# Patient Record
Sex: FEMALE | Race: BLACK OR AFRICAN AMERICAN
[De-identification: names, ages, dates, MRNs, and addresses within clinical notes are randomized per-mention and may not be internally consistent; named-entity substitution may affect disease eponyms.]

---

## 2018-05-13 ENCOUNTER — HOSPITAL ENCOUNTER (EMERGENCY)
Dept: HOSPITAL 74 - JER | Age: 18
Discharge: HOME | End: 2018-05-13
Payer: COMMERCIAL

## 2018-05-13 VITALS — HEART RATE: 71 BPM | SYSTOLIC BLOOD PRESSURE: 125 MMHG | TEMPERATURE: 97.9 F | DIASTOLIC BLOOD PRESSURE: 69 MMHG

## 2018-05-13 VITALS — BODY MASS INDEX: 34.4 KG/M2

## 2018-05-13 DIAGNOSIS — J45.21: Primary | ICD-10-CM

## 2018-05-13 PROCEDURE — 3E0F7GC INTRODUCTION OF OTHER THERAPEUTIC SUBSTANCE INTO RESPIRATORY TRACT, VIA NATURAL OR ARTIFICIAL OPENING: ICD-10-PCS | Performed by: EMERGENCY MEDICINE

## 2018-05-13 PROCEDURE — 3E033GC INTRODUCTION OF OTHER THERAPEUTIC SUBSTANCE INTO PERIPHERAL VEIN, PERCUTANEOUS APPROACH: ICD-10-PCS | Performed by: EMERGENCY MEDICINE

## 2018-05-13 PROCEDURE — 3E0333Z INTRODUCTION OF ANTI-INFLAMMATORY INTO PERIPHERAL VEIN, PERCUTANEOUS APPROACH: ICD-10-PCS | Performed by: EMERGENCY MEDICINE

## 2018-05-13 NOTE — PDOC
History of Present Illness





- General


Stated Complaint: ASTHMA


History Source: Patient


Exam Limitations: No Limitations





- History of Present Illness


Initial Comments: 


This is a 17 YOF with h/o asthma (never admitted, intubated, or placed on 

steroids) who presents from [facility] for SOB, wheezing, cough, and chest/

upper back tightness. She notes that this is typical of her normal asthma 

exacerbations in every way, and the only reason she came into the ED today is 

that she lost her normal albuterol MDI and has no refills left at the pharmacy. 

She denies any recent fever, chills, nausea, vomiting, diarrhea, constipation, 

rashes, headache, neck pain, palpitations, or other symptoms. She uses the 

Nexplanon for birth control, has not had recent leg swelling/pain, no recent 

travel or long periods of immobility, no recent surgery, etc.





Past History





- Past Medical History


Allergies/Adverse Reactions: 


 Allergies











Allergy/AdvReac Type Severity Reaction Status Date / Time


 


No Known Allergies Allergy   Verified 05/12/16 11:02











Home Medications: 


Ambulatory Orders





Etonogestrel [Nexplanon] 68 mg SQ ASDIR 05/19/16 


Albuterol Sulfate [Proventil HFA Inhaler -] 1 - 2 inh PO QID #1 inhaler 05/13/ 18 


Prednisone [Deltasone] 40 mg PO DAILY #4 tablet 05/13/18 








Asthma: No (patient DENIES asthma)





- Surgical History


Appendectomy: Yes





- Immunization History


Immunization Up to Date: Yes





- Suicide/Smoking/Psychosocial Hx


Smoking History: Never smoked


Have you smoked in the past 12 months: Yes


Number of Cigarettes Smoked Daily: 6


'Breaking Loose' booklet given: 05/12/16


Hx Alcohol Use: No


Drug/Substance Use Hx: No


Substance Use Type: None





**Review of Systems





- Review of Systems


Able to Perform ROS?: Yes


Constitutional: No: Chills, Fever, Unexplained wgt Loss


HEENTM: No: Nose Congestion, Throat Pain


Respiratory: Yes: Cough, Shortness of Breath, Wheezing


Cardiac (ROS): No: Chest Pain, Palpitations


ABD/GI: No: Constipated, Diarrhea, Nausea, Vomiting


: No: Burning, Dysuria


Musculoskeletal: Yes: Back Pain (upper).  No: Neck Pain


Integumentary: No: Bruising, Rash


Neurological: No: Headache, Numbness, Tingling, Weakness, Dizziness


Endocrine: No: Unexplained Weight Gain, Unexplained Weight Loss





*Physical Exam





- Physical Exam


General Appearance: Yes: Nourished, Appropriately Dressed, Other (nontoxic 

appearing young adult female in minimal distress after DuoNeb)


HEENT: positive: EOMI, Normal Voice, Hearing Grossly Normal.  negative: Scleral 

Icterus (R), Scleral Icterus (L), Nasal Congestion


Neck: positive: Trachea midline, Supple.  negative: Tender, Rigid


Respiratory/Chest: positive: Respiratory Distress (minimal), Rhonchi, Wheezing (

marked bilateral expiratory).  negative: Accessory Muscle Use, Crackles, Stridor


Cardiovascular: positive: Regular Rhythm, Regular Rate, S1, S2.  negative: Edema

, JVD, Murmur


Gastrointestinal/Abdominal: positive: Normal Bowel Sounds, Flat, Soft.  negative

: Tender, Organomegaly, Pulsatile Mass, Guarding


Musculoskeletal: positive: Normal Inspection.  negative: Decreased Range of 

Motion, Vertebral Tenderness


Extremity: positive: Normal Capillary Refill, Normal Inspection, Normal Range 

of Motion.  negative: Tender, Cyanosis


Integumentary: positive: Normal Color, Dry, Warm.  negative: Erythema, Rash, 

Bruising


Neurologic: positive: CNs II-XII NML intact (grossly), Fully Oriented, Alert, 

Normal Mood/Affect, Normal Response, Motor Strength 5/5





**Heart Score/ECG Review


  ** #1





05/13/18 05:11


NSR, rate 87, normal axis and intervals, no ST-T changes





Medical Decision Making





- Medical Decision Making


Patient with h/o asthma p/w respiratory distress like their prior asthma 

exacerbation.


No reported h/o asthma resulting in intubation, PTX, seizure, LOC, hypercapnia, 

acidosis, etc.





 Initial Vital Signs











Temp Pulse Resp BP Pulse Ox


 


 97.8 F   88   20   124/80   100 


 


 05/13/18 05:24  05/13/18 05:24  05/13/18 05:24  05/13/18 05:24  05/13/18 05:24











Exam: Mild labored respirations, marked bilateral expiratory wheezes and rhonchi

, receiving DuoNebs during exam.


DDX IBNLT: asthma exacerbation, COPD, bronchitis, viral URI, PNA, PTX, PE, etc.


W/U ordered: EKG, CXR


TX ordered: DuoNebs x3, SoluMedrol, Magnesium 1 mg.





EKG: NSR, rate 87, normal axis and intervals, no ST-T changes


CXR: NADP


Reassessment: Patient feels much better, comfortable going home


Repeat VS:





The patient has gotten significant relief of symptoms with ED medications.


Workup is not concerning for emergency-level pathology at this time.


Patient states they need new E-Rx for Albuterol MDI.


E-Rx sent to patient's pharmacy for Albuterol MDI.


4 day course prednisone 40 mg sent to pharmacy.


The patient is appropriate for discharge with close outpatient follow up.


The patient is comfortable with this plan and will follow up with their PCP in 1

-3 days.


Return precautions are discussed and they will come back to the ER if necessary.





*DC/Admit/Observation/Transfer


Diagnosis at time of Disposition: 


Asthma exacerbation


Qualifiers:


 Asthma severity: unspecified severity Asthma persistence: intermittent 

Qualified Code(s): J45.21 - Mild intermittent asthma with (acute) exacerbation








- Discharge Dispostion


Disposition: HOME


Condition at time of disposition: Stable


Decision to Admit order: No





- Prescriptions


Prescriptions: 


Albuterol Sulfate [Proventil HFA Inhaler -] 1 - 2 inh PO QID #1 inhaler


Prednisone [Deltasone] 40 mg PO DAILY #4 tablet





- Referrals





- Patient Instructions


Printed Discharge Instructions:  DI for Asthma -- Adult


Additional Instructions: 


You were seen in the ER for asthma exacerbation. We gave you steroids and 

breathing treatments which resolved your symptoms while you were here in the 

department. We did blood work and a chest x-ray which did not show any 

concerning findings. After our assessment, we do not believe you are having a 

medical emergency at this time, and we believe you are safe to go home. We are 

sending a prescription for prednisone to your pharmacy. Please take the whole 

course as prescribed, and follow up with your regular doctor(s) in the next 1-3 

days. Call their clinic ASAP, tell them you were seen in the ER for asthma, and 

tell them you need an appointment. If you are using your Albuterol inhaler more 

than three times a day regularly, you need to talk with your doctor about 

starting on another type of asthma inhaler that is preventative and scheduled. 

Please come back to the ER at any time (24 hours a day) for any new or 

worsening symptoms, like worsened wheezing/shortness of breath that is not 

relieved with your home medications, new severe chest pain, loss of 

consciousness, or seizure. If you are having severe or life threatening symptoms

, or symptoms that make it unsafe to drive or have someone drive you, please 

call 911.





- Post Discharge Activity

## 2018-05-13 NOTE — EKG
Test Reason : 

Blood Pressure : ***/*** mmHG

Vent. Rate : 087 BPM     Atrial Rate : 087 BPM

   P-R Int : 152 ms          QRS Dur : 078 ms

    QT Int : 372 ms       P-R-T Axes : 066 078 051 degrees

   QTc Int : 447 ms

 

NORMAL SINUS RHYTHM

NORMAL ECG

WHEN COMPARED WITH ECG OF 12-MAY-2016 10:57,

NO SIGNIFICANT CHANGE WAS FOUND

Confirmed by JESSICA MARISCAL MD ([id number]) on 5/13/2018 9:44:46 PM

 

Referred By:             Confirmed By:JESSICA MARISCAL MD

## 2019-06-11 ENCOUNTER — HOSPITAL ENCOUNTER (EMERGENCY)
Dept: HOSPITAL 74 - JER | Age: 19
LOS: 1 days | Discharge: HOME | End: 2019-06-12
Payer: COMMERCIAL

## 2019-06-11 DIAGNOSIS — J45.901: Primary | ICD-10-CM

## 2019-06-11 DIAGNOSIS — F17.210: ICD-10-CM

## 2019-06-11 DIAGNOSIS — J20.9: ICD-10-CM

## 2019-07-12 ENCOUNTER — HOSPITAL ENCOUNTER (EMERGENCY)
Dept: HOSPITAL 74 - JERFT | Age: 19
Discharge: HOME | End: 2019-07-12
Payer: SELF-PAY

## 2019-07-12 VITALS — HEART RATE: 83 BPM | TEMPERATURE: 98.5 F | DIASTOLIC BLOOD PRESSURE: 62 MMHG | SYSTOLIC BLOOD PRESSURE: 120 MMHG

## 2019-07-12 VITALS — BODY MASS INDEX: 38 KG/M2

## 2019-07-12 DIAGNOSIS — S86.012A: ICD-10-CM

## 2019-07-12 DIAGNOSIS — O99.89: Primary | ICD-10-CM
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
## 2019-07-12 DIAGNOSIS — Z3A.01: ICD-10-CM
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
## 2019-07-12 NOTE — PDOC
Rapid Medical Evaluation


Time Seen by Provider: 07/12/19 18:05


Medical Evaluation: 


 Allergies











Allergy/AdvReac Type Severity Reaction Status Date / Time


 


apple Allergy   Verified 06/11/19 23:05











07/12/19 18:07


I have performed a brief in-person evaluation of this patient.





The patient presents with a chief complaint of: sudden onset left calf pain 

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





Pertinent physical exam findings: tender to left posterior ankle





I have ordered the following: xray, ice





The patient will proceed to the ED for further evaluation.


07/12/19 18:10








**Discharge Disposition





- Diagnosis


 Left ankle pain








- Referrals





- Patient Instructions





- Post Discharge Activity

## 2019-07-12 NOTE — PDOC
History of Present Illness





- General


Chief Complaint: Pain, Acute


Stated Complaint: LEFT ANKLE PAIN


Time Seen by Provider: 07/12/19 18:05


History Source: Patient


Exam Limitations: No Limitations





Past History





- Past Medical History


Allergies/Adverse Reactions: 


 Allergies











Allergy/AdvReac Type Severity Reaction Status Date / Time


 


apple Allergy   Verified 07/12/19 18:10











Home Medications: 


Ambulatory Orders





Acetaminophen [Tylenol] 650 mg PO Q4H #45 capsule 07/12/19 


Prenat 115/Iron Fum/Folic/Dss [Prenatal 19 Tablet] 1 each PO DAILY #30 tablet 07 /12/19 








Asthma: No (patient DENIES asthma)


COPD: No





- Surgical History


Appendectomy: Yes





- Immunization History


Immunization Up to Date: Yes





- Suicide/Smoking/Psychosocial Hx


Smoking History: Never smoked


Have you smoked in the past 12 months: No


Number of Cigarettes Smoked Daily: 6


'Breaking Loose' booklet given: 05/12/16


Hx Alcohol Use: No


Drug/Substance Use Hx: No


Substance Use Type: None





**Review of Systems





- Review of Systems


Able to Perform ROS?: Yes


Comments:: 





07/12/19 19:51


CONSTITUTIONAL: 


Absent: fever, chills, diaphoresis, generalized weakness, malaise, loss of 

appetite


MUSCULOSKELETAL: 


Present: L leg pain, unable to flex L foot. Absent: arthralgia, joint swelling


SKIN: 


Absent: rash, itching, pallor


NEUROLOGIC: 


Absent: headache, focal weakness or paresthesias, dizziness, unsteady gait, 

seizure, mental status changes, bladder or bowel incontinence


PSYCHIATRIC: 


Absent: anxiety, depression, suicidal or homicidal ideation, hallucinations.





Is the patient limited English proficient: No





*Physical Exam





- Vital Signs


 Last Vital Signs











Temp Pulse Resp BP Pulse Ox


 


 98.5 F   83   16   120/62   99 


 


 07/12/19 18:05  07/12/19 18:05  07/12/19 18:05  07/12/19 18:05  07/12/19 18:05














- Physical Exam


Comments: 





07/12/19 19:52


GENERAL: The patient is awake, alert, and fully oriented, in no acute distress.


HEAD: Normal with no signs of trauma.


EYES: Pupils equal, round and reactive to light, extraocular movements intact, 

sclera anicteric, conjunctiva clear.


EXTREMITIES: L lower leg with TTP at the insertion of the achilles tendon at 

the heel. (+) Cardoza test. Distal pulses intact. Normal range of motion, no 

edema.


NEUROLOGICAL: Normal speech, normal gait.


PSYCH: Normal mood, normal affect.


SKIN: Warm, Dry, normal turgor, no rashes or lesions noted.





Procedures





- Splinting


Splint Location: Left: Ankle


Pre-Proc Neuro Vasc Exam: normal


Hand-Made Type: orthoglass


Splint Type: Yes: Long Leg (volar to stabilize a ruptured Achilles tendon.)


Post-Proc Neuro Vasc Exam: unchanged from pre-exam


Ace Bandage: 4"





ED Treatment Course





- ADDITIONAL ORDERS


Additional order review: 


 Laboratory  Results











  07/12/19





  18:13


 


Urine HCG, Qual  Positive














Medical Decision Making





- Medical Decision Making





07/12/19 20:11


The patient is a 19-year-old female who presents to the ER today with left 

ankle pain.  The patient states she was moving something at work when she felt 

like someone kicked her in the back of the ankle.  She states she is unable to 

put any weight on that foot at this time.  Denies birth control use, Cipro or 

Levaquin use.  Denies numbness and tingling to the affected extremity.





A/P: Achilles tendon tear


On exam patient with a positive Cardoza test on the left side.  Unable to flex 

with any strength.


X-ray of the tib-fib shows no acute fractures


Clinically suspect a left Achilles tendon tear.


Patient was placed in a volar splint in extension of the left leg


Of note patient is also pregnancy test positive.  OB/GYN follow-up was given.


Patient referred to the [facility] outpatient clinic for 

further care of her Achilles tendon tear she does not have insurance at this 

time.


Crutches given patient may nonweightbearing


Discharge home


I discussed the physical exam findings, ancillary test results and final 

diagnoses with the patient. I answered all of the patient's questions. The 

patient was satisfied with the care received and felt comfortable with the 

discharge plan and treatment plan.  The Patient agrees to follow up with the 

primary care physician/specialist within 24-72 hours. Return precautions were 

given.





*DC/Admit/Observation/Transfer


Diagnosis at time of Disposition: 


Achilles tendon rupture


Qualifiers:


 Encounter type: initial encounter Laterality: left Qualified Code(s): S86.012A 

- Strain of left Achilles tendon, initial encounter





Pregnancy


Qualifiers:


 Weeks of gestation: less than 8 weeks Qualified Code(s): Z3A.01 - Less than 8 

weeks gestation of pregnancy








- Discharge Dispostion


Disposition: HOME


Condition at time of disposition: Stable


Decision to Admit order: No





- Prescriptions


Prescriptions: 


Acetaminophen [Tylenol] 650 mg PO Q4H #45 capsule


Prenat 115/Iron Fum/Folic/Dss [Prenatal 19 Tablet] 1 each PO DAILY #30 tablet





- Referrals


Referrals: 


Odin Montano MD [Staff Physician] - 





- Patient Instructions


Printed Discharge Instructions:  DI for Achilles Tendon Rupture


Additional Instructions: 


You ruptured her Achilles tendon.


Please leave the splint on until you are seen by orthopedics.


Do not get the splint wet


You need to follow-up with orthopedics on Monday for further evaluation.  


Please follow-up with [facility] in the orthopedic clinic.


You are also pregnant


Please follow up with OB/GYN. A referral has been provided to you


Start taking daily prenatal vitamins


You may take Tylenol 650mg every 4 hours as needed for pain 





Return to the ER for worsening pain, fever, or if you have any changes in your 

symptoms





For orthopedics


[facility]


[facility] ([facility] 


[address] [place] [id number]    (Directions) [phone]





- Post Discharge Activity

## 2019-07-24 ENCOUNTER — HOSPITAL ENCOUNTER (EMERGENCY)
Dept: HOSPITAL 74 - JER | Age: 19
Discharge: HOME | End: 2019-07-24
Payer: COMMERCIAL

## 2019-07-24 VITALS — BODY MASS INDEX: 38.3 KG/M2

## 2019-07-24 VITALS — HEART RATE: 89 BPM | DIASTOLIC BLOOD PRESSURE: 71 MMHG | SYSTOLIC BLOOD PRESSURE: 116 MMHG | TEMPERATURE: 97.9 F

## 2019-07-24 DIAGNOSIS — O26.891: Primary | ICD-10-CM

## 2019-07-24 DIAGNOSIS — Z3A.01: ICD-10-CM

## 2019-07-24 DIAGNOSIS — R10.9: ICD-10-CM

## 2019-07-24 LAB
ALBUMIN SERPL-MCNC: 3.8 G/DL (ref 3.4–5)
ALP SERPL-CCNC: 53 U/L (ref 45–117)
ALT SERPL-CCNC: 12 U/L (ref 13–61)
ANION GAP SERPL CALC-SCNC: 7 MMOL/L (ref 8–16)
APPEARANCE UR: CLEAR
AST SERPL-CCNC: 7 U/L (ref 15–37)
BASOPHILS # BLD: 0.2 % (ref 0–2)
BILIRUB SERPL-MCNC: 0.3 MG/DL (ref 0.2–1)
BILIRUB UR STRIP.AUTO-MCNC: NEGATIVE MG/DL
BUN SERPL-MCNC: 10.8 MG/DL (ref 7–18)
CALCIUM SERPL-MCNC: 8.8 MG/DL (ref 8.5–10.1)
CHLORIDE SERPL-SCNC: 107 MMOL/L (ref 98–107)
CO2 SERPL-SCNC: 24 MMOL/L (ref 21–32)
COLOR UR: YELLOW
CREAT SERPL-MCNC: 0.7 MG/DL (ref 0.55–1.3)
DEPRECATED RDW RBC AUTO: 13.1 % (ref 11.6–15.6)
EOSINOPHIL # BLD: 1.2 % (ref 0–4.5)
GLUCOSE SERPL-MCNC: 97 MG/DL (ref 74–106)
HCT VFR BLD CALC: 40.1 % (ref 32.4–45.2)
HGB BLD-MCNC: 14 GM/DL (ref 10.7–15.3)
KETONES UR QL STRIP: (no result)
LEUKOCYTE ESTERASE UR QL STRIP.AUTO: NEGATIVE
LYMPHOCYTES # BLD: 23.7 % (ref 8–40)
MCH RBC QN AUTO: 31.7 PG (ref 25.7–33.7)
MCHC RBC AUTO-ENTMCNC: 34.8 G/DL (ref 32–36)
MCV RBC: 91 FL (ref 80–96)
MONOCYTES # BLD AUTO: 5.7 % (ref 3.8–10.2)
NEUTROPHILS # BLD: 69.2 % (ref 42.8–82.8)
NITRITE UR QL STRIP: NEGATIVE
PH UR: 6 [PH] (ref 5–8)
PLATELET # BLD AUTO: 257 K/MM3 (ref 134–434)
PMV BLD: 8.6 FL (ref 7.5–11.1)
POTASSIUM SERPLBLD-SCNC: 4 MMOL/L (ref 3.5–5.1)
PROT SERPL-MCNC: 6.8 G/DL (ref 6.4–8.2)
PROT UR QL STRIP: NEGATIVE
PROT UR QL STRIP: NEGATIVE
RBC # BLD AUTO: 4.4 M/MM3 (ref 3.6–5.2)
SODIUM SERPL-SCNC: 139 MMOL/L (ref 136–145)
SP GR UR: 1.03 (ref 1.01–1.03)
UROBILINOGEN UR STRIP-MCNC: 0.2 MG/DL (ref 0.2–1)
WBC # BLD AUTO: 10.5 K/MM3 (ref 4–10)

## 2019-07-24 NOTE — PDOC
*Physical Exam





- Vital Signs


 Last Vital Signs











Temp Pulse Resp BP Pulse Ox


 


 97.9 F   89   17   116/71   100 


 


 07/24/19 10:34  07/24/19 10:34  07/24/19 10:34  07/24/19 10:34  07/24/19 10:34














- Physical Exam


Comments: 





07/24/19 12:11


The patient was examined by [TWAN Jones] under my direct supervision. I 

personally evaluated the patient. I concur with the above findings and the plan 

of care.





ED Treatment Course





- LABORATORY


CBC & Chemistry Diagram: 


 07/24/19 11:40





 07/24/19 11:43





- ADDITIONAL ORDERS


Additional order review: 


 Laboratory  Results











  07/24/19





  11:40


 


Urine Color  Yellow


 


Urine Appearance  Clear


 


Urine pH  6.0


 


Ur Specific Gravity  1.032


 


Urine Protein  Negative


 


Urine Glucose (UA)  Negative


 


Urine Ketones  Trace H


 


Urine Blood  Negative


 


Urine Nitrite  Negative


 


Urine Bilirubin  Negative


 


Urine Urobilinogen  0.2


 


Ur Leukocyte Esterase  Negative














*DC/Admit/Observation/Transfer


Diagnosis at time of Disposition: 


 Abdominal pain during intrauterine pregnancy








- Discharge Dispostion


Condition at time of disposition: Stable





- Referrals





- Patient Instructions





- Post Discharge Activity

## 2019-07-24 NOTE — PDOC
History of Present Illness





- General


Chief Complaint: Pain


Stated Complaint: ABD PAIN/ PREGNANT 4WKS


Time Seen by Provider: 07/24/19 10:34


History Source: Patient


Exam Limitations: Clinical Condition





- History of Present Illness


Initial Comments: 





07/24/19 11:50


Patient with no significant past medical history LMP June 23 present with 

complaint of cramping lower abdominal pain since overnight which has improved 

now. Patient reported 4 weeks pregnant by LMP which she found not over week 

ago. Denies vaginal bleeding, nausea, vomiting, fever or chills. Denies any 

other symptoms


Timing/Duration: 24 hours, intermittent





Past History





- Past Medical History


Allergies/Adverse Reactions: 


 Allergies











Allergy/AdvReac Type Severity Reaction Status Date / Time


 


apple Allergy   Verified 07/24/19 10:36











Home Medications: 


Ambulatory Orders





Acetaminophen [Tylenol] 650 mg PO Q4H #45 capsule 07/12/19 


Prenat 115/Iron Fum/Folic/Dss [Prenatal 19 Tablet] 1 each PO DAILY #30 tablet 07 /12/19 








Asthma: No (patient DENIES asthma)


COPD: No





- Surgical History


Appendectomy: Yes





- Reproductive History


Is Patient Pregnant Now?: Yes





- Immunization History


Immunization Up to Date: Yes





- Suicide/Smoking/Psychosocial Hx


Smoking History: Never smoked


Have you smoked in the past 12 months: No


Number of Cigarettes Smoked Daily: 6


Information on smoking cessation initiated: No


'Breaking Loose' booklet given: 05/12/16


Hx Alcohol Use: No


Drug/Substance Use Hx: No


Substance Use Type: None





**Review of Systems





- Review of Systems


Able to Perform ROS?: Yes


Is the patient limited English proficient: No


Constitutional: No: Chills, Fever, Malaise, Weakness


HEENTM: No: Symptoms Reported


Respiratory: No: Symptoms reported


Cardiac (ROS): No: Symptoms Reported


ABD/GI: Yes: Symptoms Reported, See HPI, Abdominal cramping (mild cramping 

lower abdominal pain).  No: Abdominal Distended, Blood Streaked Bowels, 

Constipated, Diarrhea, Nausea, Vomiting


: Yes: See HPI.  No: Symptoms Reported, Burning, Dysuria, Discharge, Frequency

, Hematuria, Urgency, Other (vaginal bleeding)


Neurological: No: Symptoms reported, Dizziness


All Other Systems: Reviewed and Negative





*Physical Exam





- Vital Signs


 Last Vital Signs











Temp Pulse Resp BP Pulse Ox


 


 97.9 F   89   17   116/71   100 


 


 07/24/19 10:34  07/24/19 10:34  07/24/19 10:34  07/24/19 10:34  07/24/19 10:34














- Physical Exam


General Appearance: Yes: Nourished, Appropriately Dressed.  No: Apparent 

Distress


HEENT: positive: Normal ENT Inspection


Neck: positive: Supple


Respiratory/Chest: positive: Lungs Clear, Normal Breath Sounds.  negative: 

Respiratory Distress, Accessory Muscle Use


Cardiovascular: positive: Regular Rhythm, Regular Rate


Female Pelvic Exam: positive: normal external exam, cervical os closed, normal 

adnexa, normal size ovaries.  negative: CMT, lesions, vaginal bleeding (no 

blood in vaginal vault)


Gastrointestinal/Abdominal: positive: Normal Bowel Sounds, Flat, Soft.  negative

: Tender


Musculoskeletal: positive: Normal Inspection.  negative: CVA Tenderness


Extremity: positive: Normal Capillary Refill, Normal Inspection


Integumentary: positive: Normal Color


Neurologic: positive: Fully Oriented, Alert, Normal Mood/Affect, Normal Response





ED Treatment Course





- LABORATORY


CBC & Chemistry Diagram: 


 07/24/19 11:40





 07/24/19 11:43





- RADIOLOGY


Radiology Studies Ordered: 














 Category Date Time Status


 


 PREGNANCY <14WKS US [US] Stat Ultrasound  07/24/19 11:46 Ordered














Medical Decision Making





- Medical Decision Making





07/24/19 11:51


Patient with no significant past medical history LMP June 23 present with 

complaint of cramping lower abdominal pain since overnight which has improved 

now. Patient reported 4 weeks pregnant by LMP which she found not over week 

ago. Denies vaginal bleeding, nausea, vomiting, fever or chills. Denies any 

other symptoms


Exam unremarkable with no vaginal bleeding or abdominal tenderness on exam now. 

Cervical os closed. No CVA tenderness or CMT on exam.


Symptoms likely abdominal pain from pregnancy which has improved


CBC, CMP, type and screen lab ordered. UA urine culture lab ordered. 

Transvaginal ultrasound ordered to evaluate pregnancy. Treat based on lab and 

imaging results


07/24/19 14:23


Labs unremarkable. Pelvic ultrasound shows IUP with gestational sac and yolk 

sac consistent with 4.6 weeks pregnant. No adnexal mass on ultrasound. Patient 

is symptomatic now is stable for discharge with OB follow-up.





*DC/Admit/Observation/Transfer


Diagnosis at time of Disposition: 


 Abdominal pain during intrauterine pregnancy








- Discharge Dispostion


Disposition: HOME


Condition at time of disposition: Stable


Decision to Admit order: No





- Referrals


Referrals: 


Lauren Raymond MD [Staff Physician] - 





- Patient Instructions


Printed Discharge Instructions:  DI for Abdominal Pain -- Early Pregnancy


Additional Instructions: 


Your ultrasound shows early normal pregnancy. Your abdominal pain is likely 

caused by pregnancy hormonal. Take Tylenol as needed for pain. Apply compresses 

to abdomen as needed for pain. Follow-up with your OB or referred OB to 

establish care





- Post Discharge Activity

## 2019-08-09 ENCOUNTER — HOSPITAL ENCOUNTER (EMERGENCY)
Dept: HOSPITAL 74 - JER | Age: 19
LOS: 1 days | Discharge: HOME | End: 2019-08-10
Payer: COMMERCIAL

## 2019-08-09 VITALS — TEMPERATURE: 98.4 F | HEART RATE: 96 BPM | DIASTOLIC BLOOD PRESSURE: 77 MMHG | SYSTOLIC BLOOD PRESSURE: 115 MMHG

## 2019-08-09 VITALS — BODY MASS INDEX: 38.3 KG/M2

## 2019-08-09 DIAGNOSIS — M25.512: ICD-10-CM

## 2019-08-09 DIAGNOSIS — Z3A.01: ICD-10-CM

## 2019-08-09 DIAGNOSIS — N39.0: ICD-10-CM

## 2019-08-09 DIAGNOSIS — O26.891: Primary | ICD-10-CM

## 2019-08-09 LAB
ALBUMIN SERPL-MCNC: 3.7 G/DL (ref 3.4–5)
ALP SERPL-CCNC: 57 U/L (ref 45–117)
ALT SERPL-CCNC: 12 U/L (ref 13–61)
ANION GAP SERPL CALC-SCNC: 9 MMOL/L (ref 8–16)
APPEARANCE UR: (no result)
AST SERPL-CCNC: 8 U/L (ref 15–37)
BACTERIA # UR AUTO: 95.7 /HPF
BASOPHILS # BLD: 0.1 % (ref 0–2)
BILIRUB SERPL-MCNC: 0.5 MG/DL (ref 0.2–1)
BILIRUB UR STRIP.AUTO-MCNC: NEGATIVE MG/DL
BUN SERPL-MCNC: 7.9 MG/DL (ref 7–18)
CALCIUM SERPL-MCNC: 9.1 MG/DL (ref 8.5–10.1)
CASTS URNS QL MICRO: 157 /LPF (ref 0–8)
CHLORIDE SERPL-SCNC: 104 MMOL/L (ref 98–107)
CO2 SERPL-SCNC: 26 MMOL/L (ref 21–32)
COLOR UR: (no result)
CREAT SERPL-MCNC: 0.7 MG/DL (ref 0.55–1.3)
DEPRECATED RDW RBC AUTO: 13.1 % (ref 11.6–15.6)
EOSINOPHIL # BLD: 0.6 % (ref 0–4.5)
EPITH CASTS URNS QL MICRO: 8.2 /HPF
GLUCOSE SERPL-MCNC: 99 MG/DL (ref 74–106)
HCT VFR BLD CALC: 39 % (ref 32.4–45.2)
HGB BLD-MCNC: 13.3 GM/DL (ref 10.7–15.3)
KETONES UR QL STRIP: (no result)
LEUKOCYTE ESTERASE UR QL STRIP.AUTO: (no result)
LYMPHOCYTES # BLD: 18.2 % (ref 8–40)
MCH RBC QN AUTO: 31.2 PG (ref 25.7–33.7)
MCHC RBC AUTO-ENTMCNC: 34.2 G/DL (ref 32–36)
MCV RBC: 91.3 FL (ref 80–96)
MONOCYTES # BLD AUTO: 4.9 % (ref 3.8–10.2)
NEUTROPHILS # BLD: 76.2 % (ref 42.8–82.8)
NITRITE UR QL STRIP: NEGATIVE
PH UR: 6.5 [PH] (ref 5–8)
PLATELET # BLD AUTO: 252 K/MM3 (ref 134–434)
PMV BLD: 8.6 FL (ref 7.5–11.1)
POTASSIUM SERPLBLD-SCNC: 3.8 MMOL/L (ref 3.5–5.1)
PROT SERPL-MCNC: 6.8 G/DL (ref 6.4–8.2)
PROT UR QL STRIP: NEGATIVE
PROT UR QL STRIP: NEGATIVE
RBC # BLD AUTO: 4 /HPF (ref 0–4)
RBC # BLD AUTO: 4.27 M/MM3 (ref 3.6–5.2)
SODIUM SERPL-SCNC: 139 MMOL/L (ref 136–145)
SP GR UR: 1.02 (ref 1.01–1.03)
UROBILINOGEN UR STRIP-MCNC: 1 MG/DL (ref 0.2–1)
WBC # BLD AUTO: 12.9 K/MM3 (ref 4–10)
WBC # UR AUTO: 117 /HPF (ref 0–5)

## 2019-08-09 NOTE — PDOC
History of Present Illness





- General


Chief Complaint: Lightheaded


Stated Complaint: DIZZY LEFT ARM HURT


Time Seen by Provider: 08/09/19 21:12





Past History





- Past Medical History


Allergies/Adverse Reactions: 


 Allergies











Allergy/AdvReac Type Severity Reaction Status Date / Time


 


apple Allergy   Verified 08/09/19 23:58











Home Medications: 


Ambulatory Orders





Cephalexin [Keflex] 500 mg PO BID #10 capsule 08/10/19 








Asthma: No (patient DENIES asthma)


COPD: No





- Surgical History


Appendectomy: Yes





- Immunization History


Immunization Up to Date: Yes





- Suicide/Smoking/Psychosocial Hx


Smoking History: Never smoked


Have you smoked in the past 12 months: No


Number of Cigarettes Smoked Daily: 6


'Breaking Loose' booklet given: 05/12/16


Hx Alcohol Use: No


Drug/Substance Use Hx: No


Substance Use Type: None





*Physical Exam





- Vital Signs


 Last Vital Signs











Temp Pulse Resp BP Pulse Ox


 


 98.4 F   96 H  20   115/77   100 


 


 08/09/19 21:26  08/09/19 21:26  08/09/19 21:26  08/09/19 21:26  08/09/19 21:26














ED Treatment Course





- LABORATORY


CBC & Chemistry Diagram: 


 08/09/19 22:25





 08/09/19 22:25





- Medications


Given in the ED: 


ED Medications














Discontinued Medications














Generic Name Dose Route Start Last Admin





  Trade Name Reynaldo  PRN Reason Stop Dose Admin


 


Acetaminophen  975 mg  08/09/19 21:15  08/09/19 21:48





  Tylenol -  PO  08/09/19 21:16  975 mg





  ONCE ONE   Administration





     





     





     





     














Medical Decision Making





- Medical Decision Making





08/09/19 22:14


19 year old woman with no past medical history currently pregnant [facility] 6/23/19 

who presents with seconds of dizziness and L arm tingling for the past 1.5 

hours. She had 2 episodes of nbnb vomiting at 4am which has been normal during 

her pregnancy but again had 1x episode of vomiting at 2030. She denies any arm 

weakness, headaches, chest pain, shortness of breath, abdominal pain, dysuria, 

hematuria or fever. She has no other complaints at bedside.





ROS


GENERAL/CONSTITUTIONAL: No fever or chills. No weakness.


HEAD, EYES, EARS, NOSE AND THROAT: No change in vision. No ear pain or 

discharge. No sore throat.


CARDIOVASCULAR: No chest pain or shortness of breath


RESPIRATORY: No cough, wheezing, or hemoptysis.


GASTROINTESTINAL: No diarrhea or constipation.


GENITOURINARY: No dysuria, frequency, or change in urination.


MUSCULOSKELETAL: No joint or muscle swelling or pain. No neck or back pain.


SKIN: No rash


NEUROLOGIC: No headache, vertigo, loss of consciousness, or change in strength/

sensation.





PE


GENERAL: Awake, alert, and fully oriented, in no acute distress


HEAD: No signs of trauma, normocephalic, atraumatic 


EYES: PERRLA, EOMI, sclera anicteric, conjunctiva clear


ENT: oropharynx clear without exudates. Moist mucosa


NECK: Normal ROM, supple


LUNGS: No distress, speaks full sentences, clear to auscultation bilaterally 


HEART: Regular rate and rhythm, normal S1 and S2, no murmurs, rubs or gallops, 

peripheral pulses normal and equal bilaterally. 


ABDOMEN: Soft, nontender, normoactive bowel sounds.  No guarding, no rebound.  

No masses


EXTREMITIES : Normal inspection, Normal range of motion, no edema.  No clubbing 

or cyanosis. 


NEUROLOGICAL: Cranial nerves II through XII grossly intact.  Normal speech, no 

focal sensorimotor deficits 


SKIN: Warm, Dry, normal turgor, no rashes or lesions noted





MDM


19 year old woman with no past medical history currently pregnant [facility] 6/23/19 

who presents with seconds of dizziness and L arm tingling for the past 1.5 

hours. 





DDX including but not limited to:


r/o acs


electrolyte derangement vs infectious 


less likely cva as patient without neurological deficit on exam. 





W/U: 


- cbc, cmp, trop, ekg





ED Course: 





EKG: normal sinus rhythm HR 87, no interval abnormalities, narrow QRS, ST 

segments and morphology normal. Flattening of t waves in lead III as compared 

to prior





labs significnat for UTi otherwise within normal limits





pending TVUS


patient signed out to resident Dr. Carey.








Prerna Richardson, PGY2


Emergency Medicine











*DC/Admit/Observation/Transfer


Diagnosis at time of Disposition: 


 UTI (urinary tract infection), First trimester pregnancy





Left shoulder pain


Qualifiers:


 Chronicity: acute Qualified Code(s): M25.512 - Pain in left shoulder








- Discharge Dispostion


Disposition: HOME


Condition at time of disposition: Stable





- Prescriptions


Prescriptions: 


Cephalexin [Keflex] 500 mg PO BID #10 capsule





- Referrals


Referrals: 


[facility] [place] [Provider Group]





- Patient Instructions


Printed Discharge Instructions:  DI for Urinary Tract Infection (UTI)


Additional Instructions: 


You were seen in the ER for shoulder/arm pain and tingling, and a bladder 

infection. You are a little more than 6 weeks pregnant on your ultrasound. 

After our assessment, we do not believe you are having a medical emergency at 

this time, and we believe you are safe to go home.  and take your 

prescription that we are sending electronically to your pharmacy. Please take 

Tylenol for pain, following the instructions on the medication label. This will 

turn your urine orange and it is nothing to worry about while you are taking 

this medication. Follow up with your primary doctor in 1-3 days. Call their 

clinic ASAP, tell them you were seen in the ER, and tell them you need an 

appointment. Please come back to the ER at any time, 24 hours a day, for any 

new or worsening symptoms, like worsened pain, increased or foul-smelling 

discharge, vaginal bleeding, fever, or other symptoms. If you are having severe 

or life threatening symptoms, or symptoms that make it unsafe to drive or have 

someone drive you, please call 911.





- Post Discharge Activity

## 2019-08-09 NOTE — PDOC
Rapid Medical Evaluation


Time Seen by Provider: 08/09/19 21:12


Medical Evaluation: 


 Allergies











Allergy/AdvReac Type Severity Reaction Status Date / Time


 


apple Allergy   Verified 07/24/19 10:36











08/09/19 21:12


I have performed a brief exam on this patient.





CC: atraumatic Left shoulder pain with shooting/tingling to left elbow and 

wrist x1 hour. 6wks pregnant





PE: FAROM. Full sensation. No weakness.





Orders: tylenol





The patient will proceed to the ER for further evaluation.


08/09/19 21:14








**Discharge Disposition





- Diagnosis


 Left shoulder pain








- Referrals





- Patient Instructions





- Post Discharge Activity

## 2019-08-10 NOTE — PDOC
*Physical Exam





- Vital Signs


 Last Vital Signs











Temp Pulse Resp BP Pulse Ox


 


 98.4 F   96 H  20   115/77   100 


 


 08/09/19 21:26  08/09/19 21:26  08/09/19 21:26  08/09/19 21:26  08/09/19 21:26














- Physical Exam


Comments: 





08/10/19 02:03


Patient's care endorsed to me by Dr. Richardson pending US results.


TVUS without h/o ectopic or torsion.


US shows UTI and patient given abx dose here and E-Rx.


The Pt is appropriate for discharge with close outpatient follow up.


Workup is not concerning for emergency-level pathology at this time.


The Pt is comfortable with this plan and will follow up with her primary care 

provider in 1-3 days.


She will take OTC pain medications, pyridium, etc. for pain.


Specific return precautions are discussed and she will come back to the ER if 

necessary.





ED Treatment Course





- LABORATORY


CBC & Chemistry Diagram: 


 08/09/19 22:25





 08/09/19 22:25





- ADDITIONAL ORDERS


Additional order review: 


 Laboratory  Results











  08/09/19 08/09/19 08/09/19





  22:25 22:25 22:25


 


Sodium   139 


 


Potassium   3.8 


 


Chloride   104 


 


Carbon Dioxide   26 


 


Anion Gap   9 


 


BUN   7.9 


 


Creatinine   0.7 


 


Est GFR (CKD-EPI)AfAm   143.54 


 


Est GFR (CKD-EPI)NonAf   123.85 


 


Random Glucose   99 


 


Calcium   9.1 


 


Total Bilirubin   0.5 


 


AST   8 L 


 


ALT   12 L 


 


Alkaline Phosphatase   57 


 


Troponin I   


 


Total Protein   6.8 


 


Albumin   3.7 


 


Beta HCG, Quant    30329.4


 


Urine Color  Dk yellow  


 


Urine Appearance  Cloudy  


 


Urine pH  6.5  


 


Ur Specific Gravity  1.025  


 


Urine Protein  Negative  


 


Urine Glucose (UA)  Negative  


 


Urine Ketones  Trace H  


 


Urine Blood  Negative  


 


Urine Nitrite  Negative  


 


Urine Bilirubin  Negative  


 


Urine Urobilinogen  1.0  


 


Ur Leukocyte Esterase  2+ H  


 


Urine WBC (Auto)  117  


 


Urine RBC (Auto)  4  


 


Urine Casts (Auto)  157  


 


U Pathogenic Cast Auto  None seen  


 


U Epithel Cells (Auto)  8.2  


 


Urine Bacteria (Auto)  95.7  














  08/09/19





  22:25


 


Sodium 


 


Potassium 


 


Chloride 


 


Carbon Dioxide 


 


Anion Gap 


 


BUN 


 


Creatinine 


 


Est GFR (CKD-EPI)AfAm 


 


Est GFR (CKD-EPI)NonAf 


 


Random Glucose 


 


Calcium 


 


Total Bilirubin 


 


AST 


 


ALT 


 


Alkaline Phosphatase 


 


Troponin I  < 0.02


 


Total Protein 


 


Albumin 


 


Beta HCG, Quant 


 


Urine Color 


 


Urine Appearance 


 


Urine pH 


 


Ur Specific Gravity 


 


Urine Protein 


 


Urine Glucose (UA) 


 


Urine Ketones 


 


Urine Blood 


 


Urine Nitrite 


 


Urine Bilirubin 


 


Urine Urobilinogen 


 


Ur Leukocyte Esterase 


 


Urine WBC (Auto) 


 


Urine RBC (Auto) 


 


Urine Casts (Auto) 


 


U Pathogenic Cast Auto 


 


U Epithel Cells (Auto) 


 


Urine Bacteria (Auto) 








 











  08/09/19





  22:25


 


RBC  4.27


 


MCV  91.3


 


MCHC  34.2


 


RDW  13.1


 


MPV  8.6


 


Neutrophils %  76.2


 


Lymphocytes %  18.2  D


 


Monocytes %  4.9


 


Eosinophils %  0.6


 


Basophils %  0.1














- Medications


Given in the ED: 


ED Medications














Discontinued Medications














Generic Name Dose Route Start Last Admin





  Trade Name Hungq  PRN Reason Stop Dose Admin


 


Acetaminophen  975 mg  08/09/19 21:15  08/09/19 21:48





  Tylenol -  PO  08/09/19 21:16  975 mg





  ONCE ONE   Administration





     





     





     





     














*DC/Admit/Observation/Transfer


Diagnosis at time of Disposition: 


 First trimester pregnancy





Left shoulder pain


Qualifiers:


 Chronicity: acute Qualified Code(s): M25.512 - Pain in left shoulder





UTI (urinary tract infection)


Qualifiers:


 Urinary tract infection type: acute cystitis Hematuria presence: with 

hematuria Qualified Code(s): N30.01 - Acute cystitis with hematuria








- Discharge Dispostion


Disposition: HOME


Condition at time of disposition: Stable


Decision to Admit order: No





- Prescriptions


Prescriptions: 


Cephalexin [Keflex] 500 mg PO BID #10 capsule





- Referrals


Referrals: 


[facility] [place] [Provider Group]





- Patient Instructions


Printed Discharge Instructions:  DI for Urinary Tract Infection (UTI)


Additional Instructions: 


You were seen in the ER for shoulder/arm pain and tingling, and a bladder 

infection. You are a little more than 6 weeks pregnant on your ultrasound. 

After our assessment, we do not believe you are having a medical emergency at 

this time, and we believe you are safe to go home.  and take your 

prescription that we are sending electronically to your pharmacy. Please take 

Tylenol for pain, following the instructions on the medication label. This will 

turn your urine orange and it is nothing to worry about while you are taking 

this medication. Follow up with your primary doctor in 1-3 days. Call their 

clinic ASAP, tell them you were seen in the ER, and tell them you need an 

appointment. Please come back to the ER at any time, 24 hours a day, for any 

new or worsening symptoms, like worsened pain, increased or foul-smelling 

discharge, vaginal bleeding, fever, or other symptoms. If you are having severe 

or life threatening symptoms, or symptoms that make it unsafe to drive or have 

someone drive you, please call 911.





- Post Discharge Activity

## 2019-08-10 NOTE — EKG
Test Reason : 

Blood Pressure : ***/*** mmHG

Vent. Rate : 087 BPM     Atrial Rate : 087 BPM

   P-R Int : 160 ms          QRS Dur : 064 ms

    QT Int : 358 ms       P-R-T Axes : 064 057 027 degrees

   QTc Int : 430 ms

 

NORMAL SINUS RHYTHM

JUNCTIONAL ST DEPRESSION, PROBABLY NORMAL

BORDERLINE ECG

WHEN COMPARED WITH ECG OF 13-MAY-2018 05:12,

NO SIGNIFICANT CHANGE WAS FOUND

Confirmed by MD Shaan, Vargas ([id number]) on 8/10/2019 3:18:20 PM

 

Referred By:             Confirmed By:Vargas Garduno MD

## 2019-08-10 NOTE — PDOC
Documentation entered by Tracy Schofield SCRIBE, acting as scribe for 

Allyson Meier MD.








Allyson Meier MD:  This documentation has been prepared by the Kanwal acharya Mackenzie, SCRIBE, under my direction and personally reviewed by me 

in its entirety.  I confirm that the documentation accurately reflects all work

, treatment, procedures, and medical decision making performed by me.  





Attending Attestation





- Resident


Resident Name: Prerna Richardson





- ED Attending Attestation


I have performed the following: I have examined & evaluated the patient, The 

case was reviewed & discussed with the resident, I agree w/resident's findings 

& plan





- HPI


HPI: 


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 

the emergency department with one day of lightheadedness and 2 episodes of 

vomiting. Patient states this morning around 8AM she felt lightheaded, nauseous

, and vomited. Patient states she vomits regularly secondary to her pregnancy 

however she was prompted to come in because of the persistent lightheadedness 

as well as a tingling sensation radiating down her left arm. 





The patient denies chest pain, shortness of breath, and headache. 


Denies fever, chills, diarrhea and constipation.


Denies dysuria, frequency, urgency and hematuria.





Allergies: NKDA











08/09/19 23:12








- Physicial Exam


PE: 


GENERAL: The patient is in no acute distress.


ENT: Ears normal, nares patent, oropharynx clear without exudates.  Moist 

mucous membranes.


NECK: Normal range of motion, supple 


LUNGS: Breath sounds equal, clear to auscultation bilaterally.  No wheezes, and 

no crackles.


HEART: Regular rate and rhythm, normal S1 and S2 without murmur, rub or gallop.


ABDOMEN: Soft, left lower abdominal tenderness  


EXTREMITIES: Normal range of motion    


NEUROLOGICAL: Cranial nerves II through XII grossly intact.  Normal speech. No 

focal neurological deficits.


SKIN: Warm, Dry, normal turgor, no rashes or lesions noted.





08/09/19 23:09





08/10/19 00:44








- Medical Decision Making





08/10/19 00:44


19 you f presenting with a complaint of left abdominal pain


No fevers or chills








08/10/19 00:45


 Laboratory Tests











  08/09/19 08/09/19 08/09/19





  22:25 22:25 22:25


 


WBC   12.9 H 


 


Hgb   13.3 


 


Hct   39.0 


 


Plt Count   252 


 


BUN    7.9


 


Creatinine    0.7


 


Troponin I  < 0.02  


 


Urine Blood   


 


Urine Nitrite   


 


Ur Leukocyte Esterase   


 


Urine WBC (Auto)   














  08/09/19





  22:25


 


WBC 


 


Hgb 


 


Hct 


 


Plt Count 


 


BUN 


 


Creatinine 


 


Troponin I 


 


Urine Blood  Negative


 


Urine Nitrite  Negative


 


Ur Leukocyte Esterase  2+ H


 


Urine WBC (Auto)  117








UA (+)


US pending


Signed out to overnight team

## 2019-09-12 ENCOUNTER — HOSPITAL ENCOUNTER (OUTPATIENT)
Dept: HOSPITAL 74 - JER | Age: 19
LOS: 1 days | Discharge: HOME | End: 2019-09-13
Attending: OBSTETRICS & GYNECOLOGY
Payer: COMMERCIAL

## 2019-09-12 VITALS — BODY MASS INDEX: 43.4 KG/M2

## 2019-09-12 DIAGNOSIS — O03.1: Primary | ICD-10-CM

## 2019-09-12 DIAGNOSIS — D64.9: ICD-10-CM

## 2019-09-12 LAB
BASOPHILS # BLD: 0.6 % (ref 0–2)
DEPRECATED RDW RBC AUTO: 13.1 % (ref 11.6–15.6)
EOSINOPHIL # BLD: 0.7 % (ref 0–4.5)
HCT VFR BLD CALC: 31.9 % (ref 32.4–45.2)
HGB BLD-MCNC: 10.9 GM/DL (ref 10.7–15.3)
INR BLD: 1.22 (ref 0.83–1.09)
LYMPHOCYTES # BLD: 26.2 % (ref 8–40)
MCH RBC QN AUTO: 30.7 PG (ref 25.7–33.7)
MCHC RBC AUTO-ENTMCNC: 34.2 G/DL (ref 32–36)
MCV RBC: 89.6 FL (ref 80–96)
MONOCYTES # BLD AUTO: 4.5 % (ref 3.8–10.2)
NEUTROPHILS # BLD: 68 % (ref 42.8–82.8)
PLATELET # BLD AUTO: 320 K/MM3 (ref 134–434)
PMV BLD: 8.2 FL (ref 7.5–11.1)
PT PNL PPP: 14.4 SEC (ref 9.7–13)
RBC # BLD AUTO: 3.57 M/MM3 (ref 3.6–5.2)
WBC # BLD AUTO: 15.6 K/MM3 (ref 4–10)

## 2019-09-12 PROCEDURE — P9058 RBC, L/R, CMV-NEG, IRRAD: HCPCS

## 2019-09-12 PROCEDURE — P9038 RBC IRRADIATED: HCPCS

## 2019-09-12 NOTE — PDOC
Rapid Medical Evaluation


Chief Complaint: Vaginal Bleeding


Time Seen by Provider: 09/12/19 21:48


Medical Evaluation: 


 Allergies











Allergy/AdvReac Type Severity Reaction Status Date / Time


 


apple Allergy   Verified 08/09/19 23:58











09/12/19 21:50


19 year old vaginal bleeding > 2 pads per hour since 1 pm. + fetal demise in u/

s and patient reports that she took the medication for retained products today 

now with worsening vaginal bleeding. 





PE: patient alert pale, tachycardic








A: vaginal bleeding





P: labs





**Discharge Disposition





- Diagnosis


 Vaginal bleeding








- Referrals





- Patient Instructions





- Post Discharge Activity

## 2019-09-12 NOTE — PDOC
Attending Attestation





- Resident


Resident Name: TonyShmuel mcculloughie





- ED Attending Attestation


I have performed the following: I have examined & evaluated the patient, The 

case was reviewed & discussed with the resident, I agree w/resident's findings 

& plan, Exceptions are as noted





- HPI


HPI: 





09/12/19 22:44


19 F with h/o recently diagnosed fetal demise, @ 11 weeks, presenting to ED 

with vaginal bleeding, lightheadedness, weakness. Pt states that she was 

prescribed a pill to help her pass her retained POCs today. Shortly afterwards, 

at around 2 PM, she began to bleed profusely. She states she went through about 

10 diapers already. Pt endorses cramps, denies any other pain. No F/C.


Pt denies CP/SOB/palpitations.








- Physicial Exam


PE: 





09/12/19 22:46


GENERAL: + marked pallor, Awake, alert, and fully oriented


HEAD: No signs of trauma


EYES: + conjunctival pallor, PERRLA, EOMI, sclera anicteric


ENT: Auricles normal inspection, hearing grossly normal, nares patent, 

oropharynx clear without exudates. Moist mucosa


NECK: Nontender, no stepoffs, Normal ROM, supple, no lymphadenopathy, JVD, or 

masses


LUNGS: Breath sounds equal, clear to auscultation bilaterally.  No wheezes, and 

no crackles


HEART: Regular rate and rhythm, normal S1 and S2, no murmurs, rubs or gallops


ABDOMEN: + suprapubic TTP, normoactive bowel sounds.  No guarding, no rebound.  

No masses


EXTREMITIES: Normal range of motion, no edema.  No clubbing or cyanosis. No 

cords, erythema, or tenderness


NEUROLOGICAL: Cranial nerves II through XII intact. 5/5 strength and sensation 

in all extremities, Normal speech, normal gait, normal cerebellar function


SKIN: Warm, Dry, normal turgor, no rashes or lesions noted.





- Critical Care Time


Total Critical Care Time: 60


Critical Care Statement: The care of this patient involved high complexity 

decision making to prevent further life threatening deterioration of the patient

's condition and/or to evaluate & treat vital organ system(s) failure or risk 

of failure.





- Medical Decision Making





09/12/19 22:47


19 F with heavy vaginal bleeding. Likely has retained POCs. Pt hypotensive and 

tachycardic in ED. Marked pallor on exam, likely 2/2 significant blood loss.


- Labs, coags, T&S


- TVUS


- 1u uncrossed blood administered





Case discussed with Dr. Montano, who recommends starting pitocin

## 2019-09-13 VITALS — SYSTOLIC BLOOD PRESSURE: 133 MMHG | HEART RATE: 79 BPM | TEMPERATURE: 97.9 F | DIASTOLIC BLOOD PRESSURE: 75 MMHG

## 2019-09-13 LAB
BASOPHILS # BLD: 0.2 % (ref 0–2)
DEPRECATED RDW RBC AUTO: 13.6 % (ref 11.6–15.6)
EOSINOPHIL # BLD: 1.6 % (ref 0–4.5)
HCT VFR BLD CALC: 30.1 % (ref 32.4–45.2)
HGB BLD-MCNC: 10.4 GM/DL (ref 10.7–15.3)
LYMPHOCYTES # BLD: 27.5 % (ref 8–40)
MCH RBC QN AUTO: 30.4 PG (ref 25.7–33.7)
MCHC RBC AUTO-ENTMCNC: 34.6 G/DL (ref 32–36)
MCV RBC: 87.9 FL (ref 80–96)
MONOCYTES # BLD AUTO: 5.5 % (ref 3.8–10.2)
NEUTROPHILS # BLD: 65.2 % (ref 42.8–82.8)
PLATELET # BLD AUTO: 178 K/MM3 (ref 134–434)
PLATELET BLD QL SMEAR: NORMAL
PMV BLD: 8 FL (ref 7.5–11.1)
RBC # BLD AUTO: 3.43 M/MM3 (ref 3.6–5.2)
WBC # BLD AUTO: 12.6 K/MM3 (ref 4–10)

## 2019-09-13 PROCEDURE — 10D17ZZ EXTRACTION OF PRODUCTS OF CONCEPTION, RETAINED, VIA NATURAL OR ARTIFICIAL OPENING: ICD-10-PCS | Performed by: OBSTETRICS & GYNECOLOGY

## 2019-09-13 PROCEDURE — 30233N1 TRANSFUSION OF NONAUTOLOGOUS RED BLOOD CELLS INTO PERIPHERAL VEIN, PERCUTANEOUS APPROACH: ICD-10-PCS | Performed by: OBSTETRICS & GYNECOLOGY

## 2019-09-13 NOTE — HP
Past Medical History





- Primary Care Physician


PCP:: Odin Montano





- Admission


Chief Complaint: incomplete 


History of Present Illness: 





20 yo f 11 weeks prgnant found to have fetal demise at 7 weeks, was given 

Mesoprotol , had heavy vaginal bleeding, passed part of POC , tvs showed retain 

POC , admitted for possible D&C


History Source: Patient


Limitations to Obtaining History: No Limitations





- Past Medical History


Pulmonary: Yes: Asthma


Heme/Onc: Yes: Anemia





- Past Surgical History


Hx Myomectomy: No


Hx Transabdominal Cerclage: No





- Smoking History


Smoking history: Never smoked


Have you smoked in the past 12 months: No


Aproximately how many cigarettes per day: 6





- Alcohol/Substance Use


Hx Alcohol Use: No





Home Medications





- Allergies


Allergies/Adverse Reactions: 


 Allergies











Allergy/AdvReac Type Severity Reaction Status Date / Time


 


apple Allergy   Verified 19 21:55














- Home Medications


Home Medications: 


Ambulatory Orders





Albuterol Sulfate Inhaler - [Ventolin Hfa Inhaler -] 2 inh PO PRN 19 











Review of Systems





- Review of Systems


Constitutional: reports: Weakness


Eyes: reports: No Symptoms


HENT: reports: No Symptoms


Neck: reports: No Symptoms


Cardiovascular: reports: Palpitations


Genitourinary: reports: Vaginal Bleeding


Breasts: reports: No Symptoms Reported


Musculoskeletal: reports: No Symptoms


Integumentary: reports: No Symptoms


Neurological: reports: No Symptoms


Endocrine: reports: No Symptoms


Hematology/Lymphatic: reports: No Symptoms


Psychiatric: reports: No Symptoms





Physical Exam-GYN


Vital Signs: 


 Vital Signs











Temperature  98.2 F   19 02:15


 


Pulse Rate  64   19 02:15


 


Respiratory Rate  16   19 02:15


 


Blood Pressure  105/63   19 02:15


 


O2 Sat by Pulse Oximetry (%)  100   19 02:15











Constitutional: Yes: Well Nourished, No Distress, Calm


Eyes: Yes: WNL, Conjunctiva Clear, EOM Intact


HENT: Yes: WNL, Atraumatic, Normocephalic


Neck: Yes: WNL, Supple, Trachea Midline


Cardiovascular: Yes: WNL, Regular Rate and Rhythm


Respiratory: Yes: WNL, Regular, CTA Bilaterally


Gastrointestinal: Yes: WNL


...Rectal Exam: Yes: WNL


Renal/: Yes: WNL


Pelvis: Yes: WNL


External Genitalia: Yes: Normal


Internal Exam Deferred: No


Vaginal Exam: Yes: Bleeding


Cervix: Yes: Normal, Bleeding, Other (dilated)


Breast(s): Yes: WNL


Musculoskeletal: Yes: WNL


Extremities: Yes: WNL


Integumentary: Yes: WNL


Neurological: Yes: WNL, Alert, Oriented


...Motor Strength: WNL


Psychiatric: Yes: WNL, Alert, Oriented


Labs: 


 CBC, BMP





 19 22:30 











Problem List





- Problem


(1) Incomplete 


Code(s): O03.4 - INCOMPLETE SPONTANEOUS  WITHOUT COMPLICATION   





(2) Hemorr early preg-unspec


Code(s): O20.9 - HEMORRHAGE IN EARLY PREGNANCY, UNSPECIFIED   





Assessment/Plan





incomplete 


admit 


iv pitocin af does not pass POC advised D&C


risks associated with D&C discussed

## 2019-09-13 NOTE — PDOC
History of Present Illness





- General


Chief Complaint: Vaginal Bleeding


Stated Complaint: VAGINAL BLEEDING


Time Seen by Provider: 19 21:48





- History of Present Illness


Initial Comments: 





19 01:03


19 year old woman with no pmhx who presents with profuse vaginal bleeding after 

taking medications for medical . This is the first pregnancy and the 

patient was found to have fetal demise at 7weeks confirmed by TVUS at the 11th 

week (4days ago). The patient reports that after taking the medication she 

began bleeding heavily and went through 10 diapers and began feeling dizzy and 

faint. 


At triage the patient with tachycardiac to 130s and hypotensive to 90s/60s. 

Significant pallor and diaphoresis.





Patient comes from facility [phone]





ROS


GENERAL/CONSTITUTIONAL: No fever or chills. No weakness.


HEAD, EYES, EARS, NOSE AND THROAT: No change in vision. No sore throat.


CARDIOVASCULAR: No chest pain or shortness of breath


RESPIRATORY: No cough, wheezing, or hemoptysis.


GASTROINTESTINAL: No nausea, vomiting, diarrhea or constipation.


GENITOURINARY: No dysuria, frequency, or change in urination.


MUSCULOSKELETAL: No joint or muscle swelling or pain. No neck or back pain.


SKIN: No rash


NEUROLOGIC: No headache, vertigo, loss of consciousness, or change in strength/

sensation.








PE


GENERAL: awake, alert, +pale, +diaphoretic


HEAD: No signs of trauma, normocephalic, atraumatic 


EYES: PERRLA, EOMI, +conjunctival pallor


ENT: oropharynx clear without exudates. Moist mucosa


NECK: Normal ROM, supple


LUNGS: No distress, speaks full sentences, clear to auscultation bilaterally 


HEART: Regular rate and rhythm, normal S1 and S2, no murmurs, rubs or gallops, 

peripheral pulses normal and equal bilaterally. 


ABDOMEN: Soft, + suprapubic tenderness. No guarding, no rebound.  No masses


EXTREMITIES : Normal inspection, Normal range of motion, no edema.  No clubbing 

or cyanosis. 


NEUROLOGICAL: Cranial nerves II through XII grossly intact.  Normal speech, no 

focal sensorimotor deficits 


SKIN: Warm, Dry, normal turgor, no rashes or lesions noted


PELVIC: cervical not obtained, apparent products of conception in the vaginal 

canal





MDM





DDX including but not limited to:


vaginal bleeding








W/U: 


- cbc, cmp, beta, T&S, TVUS





ED Course: 


Rapid transfusion of uncrossed 1upRBC





Dr. Montano was consulted, he recommends pitocin 20u/1000cc NS @200cc/hr





TVUS: diffuse thickening of endometrial stripe, moderate hypervascu





Prerna Richardson, PGY2


Emergency Medicine














Past History





- Past Medical History


Allergies/Adverse Reactions: 


 Allergies











Allergy/AdvReac Type Severity Reaction Status Date / Time


 


apple Allergy   Verified 19 21:55











Home Medications: 


Ambulatory Orders





Cephalexin [Keflex] 500 mg PO BID #10 capsule 08/10/19 








Asthma: No (patient DENIES asthma)


COPD: No





- Surgical History


Appendectomy: Yes





- Reproductive History


Therapeutic (s) & number: No





- Immunization History


Immunization Up to Date: Yes





- Suicide/Smoking/Psychosocial Hx


Smoking History: Never smoked


Have you smoked in the past 12 months: No


Number of Cigarettes Smoked Daily: 6


Information on smoking cessation initiated: No


'Breaking Loose' booklet given: 16


Hx Alcohol Use: No


Drug/Substance Use Hx: No


Substance Use Type: None





*Physical Exam





- Vital Signs


 Last Vital Signs











Temp Pulse Resp BP Pulse Ox


 


 98.4 F   66   20   102/70   100 


 


 19 00:15  19 00:15  19 00:15  19 00:15  19 00:15














ED Treatment Course





- LABORATORY


CBC & Chemistry Diagram: 


 19 22:30








- ADDITIONAL ORDERS


Additional order review: 


 Laboratory  Results











  19





  22:30 22:30 22:26


 


PT with INR   14.40 H 


 


INR   1.22 H 


 


Beta HCG, Quant  1640.7  


 


Anti-A Titer    Cancelled


 


Blood Type    Cancelled


 


Antibody Screen    Cancelled


 


Crossmatch    See Detail














  19





  22:26


 


PT with INR 


 


INR 


 


Beta HCG, Quant 


 


Anti-A Titer 


 


Blood Type  A POSITIVE


 


Antibody Screen  Negative


 


Crossmatch  See Detail








 











  19





  22:30


 


RBC  3.57 L


 


MCV  89.6


 


MCHC  34.2


 


RDW  13.1


 


MPV  8.2


 


Neutrophils %  68.0


 


Lymphocytes %  26.2  D


 


Monocytes %  4.5


 


Eosinophils %  0.7


 


Basophils %  0.6  D














- RADIOLOGY


Radiology Studies Ordered: 














 Category Date Time Status


 


 TRANSVAGINAL ULTRASOUND US [US] Stat Ultrasound  19 21:49 Ordered














- Medications


Given in the ED: 


ED Medications














Discontinued Medications














Generic Name Dose Route Start Last Admin





  Trade Name Freq  PRN Reason Stop Dose Admin


 


Sodium Chloride  1,000 mls @ 1,000 mls/hr  19 21:48  19 22:30





  Normal Saline -  IV  19 22:47  1,000 mls/hr





  ASDIR STA   Administration





     





     





     





     


 


Oxytocin/Sodium Chloride  20 unit  19 22:47  19 23:35





  Normal Saline+20 Units Oxytocin -  IV  19 22:48  Not Given





  ONCE ONE   





     





     





     





     














*DC/Admit/Observation/Transfer


Diagnosis at time of Disposition: 


 Vaginal bleeding








- Discharge Dispostion


Condition at time of disposition: Fair


Decision to Admit order: Yes





- Referrals





- Patient Instructions





- Post Discharge Activity

## 2019-09-17 NOTE — PATH
Surgical Pathology Report



Patient Name:  LE ORTEGA

Accession #:  [id number]

Med. Rec. #:  [id number]                                                        

   /Age/Gender:  2000 (Age: 19) / F

Account:  [id number]                                                          

             Location: AMBULATORY SURG

Taken:  2019

Received:  2019

Reported:  2019

Physicians:  Odin Montano M.D.

  



Specimen(s) Received

 PRODUCTS OF CONCEPTION 





Clinical History

Missed 







Final Diagnosis

PRODUCTS OF CONCEPTION:

CHORIONIC VILLI PRESENT, CONSISTENT WITH PRODUCTS OF CONCEPTION.





***Electronically Signed***

Kassandra Sung M.D.





Gross Description

Received in formalin, labeled "products of conception" are multiple dark brown,

irregular portions of soft tissue measuring 3.5 x 3.5 x 2.0 cm in aggregate.

Representative portions are submitted in 2 cassettes.

## 2019-10-03 NOTE — OP
DATE OF OPERATION:  2019

 

PREOPERATIVE DIAGNOSIS:  Incomplete .

 

POSTOPERATIVE DIAGNOSIS:  Incomplete .

 

PROCEDURE:  Suction dilation and curettage.

 

SURGEON:  Odin Montano MD

 

ANESTHESIA:  General.

 

ESTIMATED BLOOD LOSS:  50 mL.

 

OPERATION:  Patient was taken to operating room under adequate general anesthesia in

dorsal lithotomy position.  Examination under anesthesia revealed external genitalia

to be normal, vagina with blood and blood clots.  Cervical os was slightly open with

part of products of conception seen at the cervical os.  At this time, the products

of conception was removed with a polyp forceps and then uterine cavity was sounded to

10 cm and suction curette was inserted into the uterine cavity and the content was

suctioned.  Patient tolerated the procedure well, left the OR in good condition. 

 

 

 

ODIN MONTANO M.D. [id number]

DD: 10/03/2019 15:58

DT: 10/03/2019 17:04

Job #:  [id number]

## 2019-11-10 ENCOUNTER — HOSPITAL ENCOUNTER (EMERGENCY)
Dept: HOSPITAL 74 - JER | Age: 19
Discharge: ANOTHER HOSPITAL | End: 2019-11-10
Payer: COMMERCIAL

## 2019-11-10 VITALS — HEART RATE: 68 BPM | TEMPERATURE: 98.4 F | SYSTOLIC BLOOD PRESSURE: 114 MMHG | DIASTOLIC BLOOD PRESSURE: 79 MMHG

## 2019-11-10 VITALS — BODY MASS INDEX: 37.8 KG/M2

## 2019-11-10 DIAGNOSIS — T22.151A: ICD-10-CM
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
## 2019-11-10 DIAGNOSIS — T23.231A: ICD-10-CM

## 2019-11-10 DIAGNOSIS — T21.11XA: ICD-10-CM

## 2019-11-10 DIAGNOSIS — T20.17XA: ICD-10-CM
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
## 2019-11-10 DIAGNOSIS — T22.212A: Primary | ICD-10-CM
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
## 2019-11-10 PROCEDURE — 2W24X4Z DRESSING OF CHEST WALL USING BANDAGE: ICD-10-PCS | Performed by: EMERGENCY MEDICINE

## 2019-11-10 PROCEDURE — 2W22X4Z DRESSING OF NECK USING BANDAGE: ICD-10-PCS | Performed by: EMERGENCY MEDICINE
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
## 2021-06-30 ENCOUNTER — EMERGENCY (EMERGENCY)
Facility: HOSPITAL | Age: 21
LOS: 1 days | Discharge: HOME | End: 2021-06-30
Attending: EMERGENCY MEDICINE | Admitting: EMERGENCY MEDICINE
Payer: MEDICAID

## 2021-06-30 VITALS
DIASTOLIC BLOOD PRESSURE: 87 MMHG | TEMPERATURE: 98 F | SYSTOLIC BLOOD PRESSURE: 135 MMHG | HEIGHT: 61 IN | OXYGEN SATURATION: 99 % | RESPIRATION RATE: 16 BRPM | WEIGHT: 203.05 LBS | HEART RATE: 77 BPM

## 2021-06-30 VITALS
TEMPERATURE: 98 F | OXYGEN SATURATION: 98 % | DIASTOLIC BLOOD PRESSURE: 76 MMHG | RESPIRATION RATE: 16 BRPM | HEART RATE: 74 BPM | SYSTOLIC BLOOD PRESSURE: 110 MMHG

## 2021-06-30 DIAGNOSIS — R11.0 NAUSEA: ICD-10-CM

## 2021-06-30 DIAGNOSIS — O21.9 VOMITING OF PREGNANCY, UNSPECIFIED: ICD-10-CM

## 2021-06-30 DIAGNOSIS — Z20.822 CONTACT WITH AND (SUSPECTED) EXPOSURE TO COVID-19: ICD-10-CM

## 2021-06-30 DIAGNOSIS — Z3A.01 LESS THAN 8 WEEKS GESTATION OF PREGNANCY: ICD-10-CM

## 2021-06-30 DIAGNOSIS — O99.891 OTHER SPECIFIED DISEASES AND CONDITIONS COMPLICATING PREGNANCY: ICD-10-CM

## 2021-06-30 LAB
ALBUMIN SERPL ELPH-MCNC: 3.7 G/DL — SIGNIFICANT CHANGE UP (ref 3.4–5)
ALP SERPL-CCNC: 57 U/L — SIGNIFICANT CHANGE UP (ref 40–120)
ALT FLD-CCNC: 27 U/L — SIGNIFICANT CHANGE UP (ref 12–42)
ANION GAP SERPL CALC-SCNC: 15 MMOL/L — SIGNIFICANT CHANGE UP (ref 9–16)
APPEARANCE UR: CLEAR — SIGNIFICANT CHANGE UP
APPEARANCE UR: SIGNIFICANT CHANGE UP
AST SERPL-CCNC: 27 U/L — SIGNIFICANT CHANGE UP (ref 15–37)
BACTERIA # UR AUTO: PRESENT /HPF
BASOPHILS # BLD AUTO: 0.04 K/UL — SIGNIFICANT CHANGE UP (ref 0–0.2)
BASOPHILS NFR BLD AUTO: 0.2 % — SIGNIFICANT CHANGE UP (ref 0–2)
BILIRUB SERPL-MCNC: 0.5 MG/DL — SIGNIFICANT CHANGE UP (ref 0.2–1.2)
BILIRUB UR-MCNC: ABNORMAL
BILIRUB UR-MCNC: ABNORMAL
BLD GP AB SCN SERPL QL: NEGATIVE — SIGNIFICANT CHANGE UP
BLD GP AB SCN SERPL QL: NEGATIVE — SIGNIFICANT CHANGE UP
BUN SERPL-MCNC: 9 MG/DL — SIGNIFICANT CHANGE UP (ref 7–23)
CALCIUM SERPL-MCNC: 9 MG/DL — SIGNIFICANT CHANGE UP (ref 8.5–10.5)
CHLORIDE SERPL-SCNC: 101 MMOL/L — SIGNIFICANT CHANGE UP (ref 96–108)
CO2 SERPL-SCNC: 20 MMOL/L — LOW (ref 22–31)
COLOR SPEC: YELLOW — SIGNIFICANT CHANGE UP
COLOR SPEC: YELLOW — SIGNIFICANT CHANGE UP
COMMENT - URINE: SIGNIFICANT CHANGE UP
CREAT SERPL-MCNC: 0.74 MG/DL — SIGNIFICANT CHANGE UP (ref 0.5–1.3)
DIFF PNL FLD: NEGATIVE — SIGNIFICANT CHANGE UP
DIFF PNL FLD: NEGATIVE — SIGNIFICANT CHANGE UP
EOSINOPHIL # BLD AUTO: 0.09 K/UL — SIGNIFICANT CHANGE UP (ref 0–0.5)
EOSINOPHIL NFR BLD AUTO: 0.5 % — SIGNIFICANT CHANGE UP (ref 0–6)
EPI CELLS # UR: ABNORMAL /HPF (ref 0–5)
GLUCOSE SERPL-MCNC: 95 MG/DL — SIGNIFICANT CHANGE UP (ref 70–99)
GLUCOSE UR QL: NEGATIVE — SIGNIFICANT CHANGE UP
GLUCOSE UR QL: NEGATIVE — SIGNIFICANT CHANGE UP
HCG SERPL-ACNC: HIGH MIU/ML
HCT VFR BLD CALC: 36.5 % — SIGNIFICANT CHANGE UP (ref 34.5–45)
HGB BLD-MCNC: 12.3 G/DL — SIGNIFICANT CHANGE UP (ref 11.5–15.5)
IMM GRANULOCYTES NFR BLD AUTO: 0.7 % — SIGNIFICANT CHANGE UP (ref 0–1.5)
KETONES UR-MCNC: >=80 MG/DL
KETONES UR-MCNC: >=80 MG/DL
LEUKOCYTE ESTERASE UR-ACNC: ABNORMAL
LEUKOCYTE ESTERASE UR-ACNC: ABNORMAL
LIDOCAIN IGE QN: 55 U/L — LOW (ref 73–393)
LYMPHOCYTES # BLD AUTO: 17 % — SIGNIFICANT CHANGE UP (ref 13–44)
LYMPHOCYTES # BLD AUTO: 2.84 K/UL — SIGNIFICANT CHANGE UP (ref 1–3.3)
MAGNESIUM SERPL-MCNC: 1.9 MG/DL — SIGNIFICANT CHANGE UP (ref 1.6–2.6)
MCHC RBC-ENTMCNC: 29.6 PG — SIGNIFICANT CHANGE UP (ref 27–34)
MCHC RBC-ENTMCNC: 33.7 GM/DL — SIGNIFICANT CHANGE UP (ref 32–36)
MCV RBC AUTO: 87.7 FL — SIGNIFICANT CHANGE UP (ref 80–100)
MONOCYTES # BLD AUTO: 0.77 K/UL — SIGNIFICANT CHANGE UP (ref 0–0.9)
MONOCYTES NFR BLD AUTO: 4.6 % — SIGNIFICANT CHANGE UP (ref 2–14)
NEUTROPHILS # BLD AUTO: 12.87 K/UL — HIGH (ref 1.8–7.4)
NEUTROPHILS NFR BLD AUTO: 77 % — SIGNIFICANT CHANGE UP (ref 43–77)
NITRITE UR-MCNC: NEGATIVE — SIGNIFICANT CHANGE UP
NITRITE UR-MCNC: NEGATIVE — SIGNIFICANT CHANGE UP
NRBC # BLD: 0 /100 WBCS — SIGNIFICANT CHANGE UP (ref 0–0)
PH UR: 6 — SIGNIFICANT CHANGE UP (ref 5–8)
PH UR: 6.5 — SIGNIFICANT CHANGE UP (ref 5–8)
PLATELET # BLD AUTO: 335 K/UL — SIGNIFICANT CHANGE UP (ref 150–400)
POTASSIUM SERPL-MCNC: 3.3 MMOL/L — LOW (ref 3.5–5.3)
POTASSIUM SERPL-SCNC: 3.3 MMOL/L — LOW (ref 3.5–5.3)
PROT SERPL-MCNC: 7.7 G/DL — SIGNIFICANT CHANGE UP (ref 6.4–8.2)
PROT UR-MCNC: 30 MG/DL
PROT UR-MCNC: ABNORMAL MG/DL
RBC # BLD: 4.16 M/UL — SIGNIFICANT CHANGE UP (ref 3.8–5.2)
RBC # FLD: 13.2 % — SIGNIFICANT CHANGE UP (ref 10.3–14.5)
RBC CASTS # UR COMP ASSIST: < 5 /HPF — SIGNIFICANT CHANGE UP
RH IG SCN BLD-IMP: POSITIVE — SIGNIFICANT CHANGE UP
RH IG SCN BLD-IMP: POSITIVE — SIGNIFICANT CHANGE UP
SARS-COV-2 RNA SPEC QL NAA+PROBE: SIGNIFICANT CHANGE UP
SODIUM SERPL-SCNC: 136 MMOL/L — SIGNIFICANT CHANGE UP (ref 132–145)
SP GR SPEC: 1.02 — SIGNIFICANT CHANGE UP (ref 1–1.03)
SP GR SPEC: >=1.03 — SIGNIFICANT CHANGE UP (ref 1–1.03)
UROBILINOGEN FLD QL: 1 E.U./DL — SIGNIFICANT CHANGE UP
UROBILINOGEN FLD QL: 2 E.U./DL
WBC # BLD: 16.72 K/UL — HIGH (ref 3.8–10.5)
WBC # FLD AUTO: 16.72 K/UL — HIGH (ref 3.8–10.5)
WBC UR QL: ABNORMAL /HPF

## 2021-06-30 PROCEDURE — 76817 TRANSVAGINAL US OBSTETRIC: CPT | Mod: 26

## 2021-06-30 PROCEDURE — 76801 OB US < 14 WKS SINGLE FETUS: CPT | Mod: 26

## 2021-06-30 PROCEDURE — 99284 EMERGENCY DEPT VISIT MOD MDM: CPT

## 2021-06-30 RX ORDER — FAMOTIDINE 10 MG/ML
20 INJECTION INTRAVENOUS ONCE
Refills: 0 | Status: COMPLETED | OUTPATIENT
Start: 2021-06-30 | End: 2021-06-30

## 2021-06-30 RX ORDER — SODIUM CHLORIDE 9 MG/ML
1000 INJECTION INTRAMUSCULAR; INTRAVENOUS; SUBCUTANEOUS ONCE
Refills: 0 | Status: COMPLETED | OUTPATIENT
Start: 2021-06-30 | End: 2021-06-30

## 2021-06-30 RX ORDER — SODIUM CHLORIDE 9 MG/ML
1000 INJECTION, SOLUTION INTRAVENOUS
Refills: 0 | Status: COMPLETED | OUTPATIENT
Start: 2021-06-30 | End: 2021-06-30

## 2021-06-30 RX ORDER — ONDANSETRON 8 MG/1
8 TABLET, FILM COATED ORAL ONCE
Refills: 0 | Status: COMPLETED | OUTPATIENT
Start: 2021-06-30 | End: 2021-06-30

## 2021-06-30 RX ORDER — METOCLOPRAMIDE HCL 10 MG
10 TABLET ORAL ONCE
Refills: 0 | Status: COMPLETED | OUTPATIENT
Start: 2021-06-30 | End: 2021-06-30

## 2021-06-30 RX ORDER — CEFTRIAXONE 500 MG/1
1000 INJECTION, POWDER, FOR SOLUTION INTRAMUSCULAR; INTRAVENOUS ONCE
Refills: 0 | Status: COMPLETED | OUTPATIENT
Start: 2021-06-30 | End: 2021-06-30

## 2021-06-30 RX ADMIN — FAMOTIDINE 20 MILLIGRAM(S): 10 INJECTION INTRAVENOUS at 10:36

## 2021-06-30 RX ADMIN — Medication 10 MILLIGRAM(S): at 13:22

## 2021-06-30 RX ADMIN — SODIUM CHLORIDE 1000 MILLILITER(S): 9 INJECTION, SOLUTION INTRAVENOUS at 09:47

## 2021-06-30 RX ADMIN — CEFTRIAXONE 100 MILLIGRAM(S): 500 INJECTION, POWDER, FOR SOLUTION INTRAMUSCULAR; INTRAVENOUS at 09:48

## 2021-06-30 RX ADMIN — SODIUM CHLORIDE 1000 MILLILITER(S): 9 INJECTION, SOLUTION INTRAVENOUS at 12:30

## 2021-06-30 RX ADMIN — SODIUM CHLORIDE 1000 MILLILITER(S): 9 INJECTION INTRAMUSCULAR; INTRAVENOUS; SUBCUTANEOUS at 08:26

## 2021-06-30 RX ADMIN — SODIUM CHLORIDE 1000 MILLILITER(S): 9 INJECTION INTRAMUSCULAR; INTRAVENOUS; SUBCUTANEOUS at 09:22

## 2021-06-30 RX ADMIN — ONDANSETRON 8 MILLIGRAM(S): 8 TABLET, FILM COATED ORAL at 09:47

## 2021-06-30 RX ADMIN — SODIUM CHLORIDE 1000 MILLILITER(S): 9 INJECTION INTRAMUSCULAR; INTRAVENOUS; SUBCUTANEOUS at 12:45

## 2021-06-30 RX ADMIN — CEFTRIAXONE 1000 MILLIGRAM(S): 500 INJECTION, POWDER, FOR SOLUTION INTRAMUSCULAR; INTRAVENOUS at 10:19

## 2021-06-30 NOTE — ED PROVIDER NOTE - OBJECTIVE STATEMENT
22 y/o F , spontaneous  1yr ago at approx 6-8wks, LMP sometime in May 2021, p/w copious n/v nbnb emesis, fatigue, with some intermittent pelvic cramping for the past few days. No vaginal bleeding or discharge. No active pelvic pain. No abd pain. No urinary sx. Normal BMs. No fever/chills. Denies cough/CP/SOB. Unable to tolerate PO for approx 12hrs. Had a positive urine pregnancy test, no prenatal care. Of note, pt required 2u pRBC transfusion during last miscarriage.

## 2021-06-30 NOTE — ED ADULT NURSE NOTE - OBJECTIVE STATEMENT
Pt. presents for nausea and vomiting x 1 wk. Pt. states on 6/9/21 she found out she's pregnant. Pt. states LMP was in May but does not recall exact date. Pt. states symptoms have worsen and unable to keep down any liquids or food. Pt. denies vaginal bleeding, endorsing periods of mild abdominal cramp. Denies cp/palpitations, HA, dizziness, fever,  symptoms, diarrhea or any  other discomfort.

## 2021-06-30 NOTE — ED ADULT TRIAGE NOTE - CHIEF COMPLAINT QUOTE
BIBA c/c abdominal pain with associated n/v for two days.  recent positive pregnancy test, Does not know how far along she is.  yet to follow with OB/GYN.

## 2021-06-30 NOTE — ED PROVIDER NOTE - NSFOLLOWUPINSTRUCTIONS_ED_ALL_ED_FT
Nausea and Vomiting in Pregnancy    WHAT YOU NEED TO KNOW:    Nausea and vomiting can happen any time of day. These symptoms usually start before the 9th week of pregnancy, and end by the 14th week (second trimester). Some women can have nausea and vomiting for a longer time. These symptoms can affect some women throughout the entire pregnancy. Nausea and vomiting do not harm your baby. These symptoms can make it hard for you to do your daily activities.     DISCHARGE INSTRUCTIONS:    Return to the emergency department if:   •You have signs of dehydration. Examples are dark yellow urine, dry mouth and lips, dry skin, fast heartbeat, and urinating less than usual.      •You have severe abdominal pain.      •You feel too weak or dizzy to stand up.      •You see blood in your vomit or bowel movements.      Contact your healthcare provider if:   •You vomit more than 4 times in 1 day.      •You have not been able to keep liquids down for more than 1 day.      •You lose more than 2 pounds.      •You have a fever.      •Your nausea and vomiting continue longer than 14 weeks.       •You have questions or concerns about your condition or care.      Nutrition changes you can make to manage nausea and vomiting:   •Eat small meals throughout the day instead of 3 large meals. You may be more likely to have nausea and vomiting when your stomach is empty. Eat foods that are low in fat and high in protein. Examples are lean meat, beans, turkey, and chicken without the skin. Eat a small snack, such as crackers, dry cereal, or a small sandwich before you go to bed.      •Eat some crackers or dry toast before you get out of bed in the morning. Get out of bed slowly. Sudden movements could cause you to get dizzy and nauseated.       •Eat bland foods when you feel nauseated. Examples of bland foods include dry toast, dry cereal, plain pasta, white rice, and bread. Other bland foods include saltine crackers, bananas, gelatin, and pretzels. Avoid spicy, greasy, and fried foods. Avoid any other foods that make you feel nauseated.      •Drink liquids that contain ginger. Drink ginger ale made with real ginger or ginger tea made with fresh grated ginger. Ginger capsules or ginger candies may also help to decrease nausea and vomiting.       •Drink liquids between meals instead of with meals. Wait at least 30 minutes after you eat to drink liquids. Drink small amounts of liquids often throughout the day to prevent dehydration. Ask how much liquid you should drink each day.      Other changes you can make to manage nausea and vomiting:   •Avoid smells that bother you. Strong odors may cause nausea and vomiting to start, or make it worse. Take a short walk, turn on a fan, or try to sleep with the window open to get fresh air. When you are cooking, open windows to get rid of smells that may cause nausea.      •Do not brush your teeth right after you eat if it makes you nauseated.      •Rest when you need to. Start activity slowly and work up to your usual routine as you start to feel better.      •Talk to your healthcare provider about your prenatal vitamins. Prenatal vitamins can cause nausea for some women. Try taking your prenatal vitamin at night or with a snack. If this change does not help, your healthcare provider may recommend a different type of vitamin.       •Do not use any medicines, vitamins, or supplements to manage your symptoms without asking your healthcare provider. Many medicines can harm an unborn baby.       •Light to moderate exercise may help to decrease your symptoms. It may also help you to sleep better at night. Ask your healthcare provider about the best exercise plan for you.       Follow up with your healthcare provider as directed: Write down your questions so you remember to ask them during your visits.

## 2021-06-30 NOTE — ED PROVIDER NOTE - PATIENT PORTAL LINK FT
You can access the FollowMyHealth Patient Portal offered by [facility] by registering at the following website: [email or web address]. By joining [employer]’s FollowMyHealth portal, you will also be able to view your health information using other applications (apps) compatible with our system.

## 2021-06-30 NOTE — ED PROVIDER NOTE - CLINICAL SUMMARY MEDICAL DECISION MAKING FREE TEXT BOX
Hydrated with 5L IVF including 2L of D5NS, tolerating PO, nausea well controlled, covered for possible UTI with weakly positive (possibly contaminated) UA. d/c home with keflex, zofran, prenatal vitamins. f/u with OB. Given return precautions and anticipatory guidance.

## 2021-07-01 LAB
CULTURE RESULTS: SIGNIFICANT CHANGE UP
SPECIMEN SOURCE: SIGNIFICANT CHANGE UP

## 2022-03-31 ENCOUNTER — INPATIENT (INPATIENT)
Facility: HOSPITAL | Age: 22
LOS: 2 days | Discharge: HOME | DRG: 419 | End: 2022-04-03
Attending: SURGERY | Admitting: SURGERY
Payer: COMMERCIAL

## 2022-03-31 VITALS
WEIGHT: 220.02 LBS | DIASTOLIC BLOOD PRESSURE: 88 MMHG | RESPIRATION RATE: 18 BRPM | HEART RATE: 86 BPM | OXYGEN SATURATION: 96 % | SYSTOLIC BLOOD PRESSURE: 138 MMHG | HEIGHT: 61 IN | TEMPERATURE: 98 F

## 2022-03-31 DIAGNOSIS — Z90.49 ACQUIRED ABSENCE OF OTHER SPECIFIED PARTS OF DIGESTIVE TRACT: Chronic | ICD-10-CM

## 2022-03-31 LAB
ALBUMIN SERPL ELPH-MCNC: 3.6 G/DL — SIGNIFICANT CHANGE UP (ref 3.4–5)
ALP SERPL-CCNC: 122 U/L — HIGH (ref 40–120)
ALT FLD-CCNC: 182 U/L — HIGH (ref 12–42)
AMYLASE P1 CFR SERPL: 50 U/L — SIGNIFICANT CHANGE UP (ref 25–115)
ANION GAP SERPL CALC-SCNC: 8 MMOL/L — LOW (ref 9–16)
APPEARANCE UR: SIGNIFICANT CHANGE UP
APTT BLD: 33.1 SEC — SIGNIFICANT CHANGE UP (ref 27.5–35.5)
AST SERPL-CCNC: 133 U/L — HIGH (ref 15–37)
BACTERIA # UR AUTO: ABNORMAL /HPF
BASOPHILS # BLD AUTO: 0.01 K/UL — SIGNIFICANT CHANGE UP (ref 0–0.2)
BASOPHILS NFR BLD AUTO: 0.1 % — SIGNIFICANT CHANGE UP (ref 0–2)
BILIRUB SERPL-MCNC: 0.6 MG/DL — SIGNIFICANT CHANGE UP (ref 0.2–1.2)
BILIRUB UR-MCNC: NEGATIVE — SIGNIFICANT CHANGE UP
BUN SERPL-MCNC: 11 MG/DL — SIGNIFICANT CHANGE UP (ref 7–23)
CALCIUM SERPL-MCNC: 8.7 MG/DL — SIGNIFICANT CHANGE UP (ref 8.5–10.5)
CHLORIDE SERPL-SCNC: 106 MMOL/L — SIGNIFICANT CHANGE UP (ref 96–108)
CO2 SERPL-SCNC: 26 MMOL/L — SIGNIFICANT CHANGE UP (ref 22–31)
COLOR SPEC: YELLOW — SIGNIFICANT CHANGE UP
CREAT SERPL-MCNC: 0.82 MG/DL — SIGNIFICANT CHANGE UP (ref 0.5–1.3)
DIFF PNL FLD: NEGATIVE — SIGNIFICANT CHANGE UP
EGFR: 103 ML/MIN/1.73M2 — SIGNIFICANT CHANGE UP
EOSINOPHIL # BLD AUTO: 0.22 K/UL — SIGNIFICANT CHANGE UP (ref 0–0.5)
EOSINOPHIL NFR BLD AUTO: 2.5 % — SIGNIFICANT CHANGE UP (ref 0–6)
EPI CELLS # UR: ABNORMAL /HPF (ref 0–5)
GLUCOSE SERPL-MCNC: 83 MG/DL — SIGNIFICANT CHANGE UP (ref 70–99)
GLUCOSE UR QL: NEGATIVE — SIGNIFICANT CHANGE UP
HCG UR QL: NEGATIVE — SIGNIFICANT CHANGE UP
HCT VFR BLD CALC: 36.8 % — SIGNIFICANT CHANGE UP (ref 34.5–45)
HGB BLD-MCNC: 11.3 G/DL — LOW (ref 11.5–15.5)
IMM GRANULOCYTES NFR BLD AUTO: 0.3 % — SIGNIFICANT CHANGE UP (ref 0–1.5)
INR BLD: 1.13 — SIGNIFICANT CHANGE UP (ref 0.88–1.16)
KETONES UR-MCNC: NEGATIVE — SIGNIFICANT CHANGE UP
LEUKOCYTE ESTERASE UR-ACNC: ABNORMAL
LIDOCAIN IGE QN: 77 U/L — SIGNIFICANT CHANGE UP (ref 73–393)
LYMPHOCYTES # BLD AUTO: 2.88 K/UL — SIGNIFICANT CHANGE UP (ref 1–3.3)
LYMPHOCYTES # BLD AUTO: 33.2 % — SIGNIFICANT CHANGE UP (ref 13–44)
MCHC RBC-ENTMCNC: 26.5 PG — LOW (ref 27–34)
MCHC RBC-ENTMCNC: 30.7 GM/DL — LOW (ref 32–36)
MCV RBC AUTO: 86.2 FL — SIGNIFICANT CHANGE UP (ref 80–100)
MONOCYTES # BLD AUTO: 0.41 K/UL — SIGNIFICANT CHANGE UP (ref 0–0.9)
MONOCYTES NFR BLD AUTO: 4.7 % — SIGNIFICANT CHANGE UP (ref 2–14)
NEUTROPHILS # BLD AUTO: 5.13 K/UL — SIGNIFICANT CHANGE UP (ref 1.8–7.4)
NEUTROPHILS NFR BLD AUTO: 59.2 % — SIGNIFICANT CHANGE UP (ref 43–77)
NITRITE UR-MCNC: NEGATIVE — SIGNIFICANT CHANGE UP
NRBC # BLD: 0 /100 WBCS — SIGNIFICANT CHANGE UP (ref 0–0)
PH UR: 6 — SIGNIFICANT CHANGE UP (ref 5–8)
PLATELET # BLD AUTO: 327 K/UL — SIGNIFICANT CHANGE UP (ref 150–400)
POTASSIUM SERPL-MCNC: 3.6 MMOL/L — SIGNIFICANT CHANGE UP (ref 3.5–5.3)
POTASSIUM SERPL-SCNC: 3.6 MMOL/L — SIGNIFICANT CHANGE UP (ref 3.5–5.3)
PROT SERPL-MCNC: 7.3 G/DL — SIGNIFICANT CHANGE UP (ref 6.4–8.2)
PROT UR-MCNC: NEGATIVE MG/DL — SIGNIFICANT CHANGE UP
PROTHROM AB SERPL-ACNC: 13.1 SEC — SIGNIFICANT CHANGE UP (ref 10.5–13.4)
RBC # BLD: 4.27 M/UL — SIGNIFICANT CHANGE UP (ref 3.8–5.2)
RBC # FLD: 16.1 % — HIGH (ref 10.3–14.5)
RBC CASTS # UR COMP ASSIST: < 5 /HPF — SIGNIFICANT CHANGE UP
SARS-COV-2 RNA SPEC QL NAA+PROBE: SIGNIFICANT CHANGE UP
SODIUM SERPL-SCNC: 140 MMOL/L — SIGNIFICANT CHANGE UP (ref 132–145)
SP GR SPEC: 1.02 — SIGNIFICANT CHANGE UP (ref 1–1.03)
UROBILINOGEN FLD QL: 0.2 E.U./DL — SIGNIFICANT CHANGE UP
WBC # BLD: 8.68 K/UL — SIGNIFICANT CHANGE UP (ref 3.8–10.5)
WBC # FLD AUTO: 8.68 K/UL — SIGNIFICANT CHANGE UP (ref 3.8–10.5)
WBC UR QL: > 10 /HPF

## 2022-03-31 PROCEDURE — 74177 CT ABD & PELVIS W/CONTRAST: CPT | Mod: 26

## 2022-03-31 PROCEDURE — 99285 EMERGENCY DEPT VISIT HI MDM: CPT

## 2022-03-31 PROCEDURE — 76705 ECHO EXAM OF ABDOMEN: CPT | Mod: 26

## 2022-03-31 RX ORDER — OLANZAPINE 15 MG/1
5 TABLET, FILM COATED ORAL ONCE
Refills: 0 | Status: DISCONTINUED | OUTPATIENT
Start: 2022-03-31 | End: 2022-03-31

## 2022-03-31 RX ORDER — MORPHINE SULFATE 50 MG/1
4 CAPSULE, EXTENDED RELEASE ORAL ONCE
Refills: 0 | Status: DISCONTINUED | OUTPATIENT
Start: 2022-03-31 | End: 2022-03-31

## 2022-03-31 RX ORDER — SODIUM CHLORIDE 9 MG/ML
1000 INJECTION, SOLUTION INTRAVENOUS
Refills: 0 | Status: DISCONTINUED | OUTPATIENT
Start: 2022-03-31 | End: 2022-04-03

## 2022-03-31 RX ORDER — ONDANSETRON 8 MG/1
4 TABLET, FILM COATED ORAL EVERY 6 HOURS
Refills: 0 | Status: DISCONTINUED | OUTPATIENT
Start: 2022-03-31 | End: 2022-04-03

## 2022-03-31 RX ORDER — SODIUM CHLORIDE 9 MG/ML
1000 INJECTION INTRAMUSCULAR; INTRAVENOUS; SUBCUTANEOUS ONCE
Refills: 0 | Status: COMPLETED | OUTPATIENT
Start: 2022-03-31 | End: 2022-03-31

## 2022-03-31 RX ORDER — ONDANSETRON 8 MG/1
4 TABLET, FILM COATED ORAL ONCE
Refills: 0 | Status: COMPLETED | OUTPATIENT
Start: 2022-03-31 | End: 2022-03-31

## 2022-03-31 RX ORDER — CEFTRIAXONE 500 MG/1
1000 INJECTION, POWDER, FOR SOLUTION INTRAMUSCULAR; INTRAVENOUS ONCE
Refills: 0 | Status: COMPLETED | OUTPATIENT
Start: 2022-03-31 | End: 2022-03-31

## 2022-03-31 RX ORDER — ACETAMINOPHEN 500 MG
1000 TABLET ORAL ONCE
Refills: 0 | Status: COMPLETED | OUTPATIENT
Start: 2022-03-31 | End: 2022-03-31

## 2022-03-31 RX ORDER — IOHEXOL 300 MG/ML
30 INJECTION, SOLUTION INTRAVENOUS ONCE
Refills: 0 | Status: COMPLETED | OUTPATIENT
Start: 2022-03-31 | End: 2022-03-31

## 2022-03-31 RX ORDER — KETOROLAC TROMETHAMINE 30 MG/ML
15 SYRINGE (ML) INJECTION ONCE
Refills: 0 | Status: DISCONTINUED | OUTPATIENT
Start: 2022-03-31 | End: 2022-03-31

## 2022-03-31 RX ORDER — INFLUENZA VIRUS VACCINE 15; 15; 15; 15 UG/.5ML; UG/.5ML; UG/.5ML; UG/.5ML
0.5 SUSPENSION INTRAMUSCULAR ONCE
Refills: 0 | Status: DISCONTINUED | OUTPATIENT
Start: 2022-03-31 | End: 2022-04-03

## 2022-03-31 RX ORDER — TETANUS TOXOID, REDUCED DIPHTHERIA TOXOID AND ACELLULAR PERTUSSIS VACCINE, ADSORBED 5; 2.5; 8; 8; 2.5 [IU]/.5ML; [IU]/.5ML; UG/.5ML; UG/.5ML; UG/.5ML
0.5 SUSPENSION INTRAMUSCULAR ONCE
Refills: 0 | Status: DISCONTINUED | OUTPATIENT
Start: 2022-03-31 | End: 2022-03-31

## 2022-03-31 RX ADMIN — ONDANSETRON 4 MILLIGRAM(S): 8 TABLET, FILM COATED ORAL at 21:32

## 2022-03-31 RX ADMIN — SODIUM CHLORIDE 140 MILLILITER(S): 9 INJECTION, SOLUTION INTRAVENOUS at 20:49

## 2022-03-31 RX ADMIN — Medication 1000 MILLIGRAM(S): at 21:45

## 2022-03-31 RX ADMIN — IOHEXOL 30 MILLILITER(S): 300 INJECTION, SOLUTION INTRAVENOUS at 12:59

## 2022-03-31 RX ADMIN — CEFTRIAXONE 100 MILLIGRAM(S): 500 INJECTION, POWDER, FOR SOLUTION INTRAMUSCULAR; INTRAVENOUS at 16:12

## 2022-03-31 RX ADMIN — MORPHINE SULFATE 4 MILLIGRAM(S): 50 CAPSULE, EXTENDED RELEASE ORAL at 15:10

## 2022-03-31 RX ADMIN — Medication 400 MILLIGRAM(S): at 21:26

## 2022-03-31 RX ADMIN — MORPHINE SULFATE 4 MILLIGRAM(S): 50 CAPSULE, EXTENDED RELEASE ORAL at 16:12

## 2022-03-31 RX ADMIN — SODIUM CHLORIDE 1000 MILLILITER(S): 9 INJECTION INTRAMUSCULAR; INTRAVENOUS; SUBCUTANEOUS at 10:51

## 2022-03-31 RX ADMIN — MORPHINE SULFATE 4 MILLIGRAM(S): 50 CAPSULE, EXTENDED RELEASE ORAL at 10:51

## 2022-03-31 RX ADMIN — ONDANSETRON 4 MILLIGRAM(S): 8 TABLET, FILM COATED ORAL at 16:12

## 2022-03-31 RX ADMIN — Medication 15 MILLIGRAM(S): at 10:50

## 2022-03-31 NOTE — H&P ADULT - NSHPLABSRESULTS_GEN_ALL_CORE
11.3   8.68  )-----------( 327      ( 31 Mar 2022 11:01 )             36.8   03-31    140  |  106  |  11  ----------------------------<  83  3.6   |  26  |  0.82    Ca    8.7      31 Mar 2022 11:01    TPro  7.3  /  Alb  3.6  /  TBili  0.6  /  DBili  x   /  AST  133<H>  /  ALT  182<H>  /  AlkPhos  122<H>  03-31    RUQ US:  Distended gallbladder with single stone measuring 0.4 cm. Positive Hall's. No gallbladder wall thickening or pericholecystic fluid.    CT scan:  Mild symmetric bilobar intrahepatic duct dilatation. Common bile duct measures 1.4 cm and is dilated in the pancreatic portion up to 0.8 cm. No radiopaque biliary stones seen.

## 2022-03-31 NOTE — ED PROVIDER NOTE - ATTENDING CONTRIBUTION TO CARE
I have seen the pt, reviewed all pertinent clinical data, and I agree with the documentation/care/plan executed by RASHAAD Sauceda.

## 2022-03-31 NOTE — ED ADULT TRIAGE NOTE - WEIGHT IN LBS
OFFICE VISIT      Patient: Abdoul Mcgill Date of Service: 3/11/2021   : 1952 MRN: [id number]     SUBJECTIVE:   HISTORY OF PRESENT ILLNESS:  Abdoul Mcgill is a 69 year old male who presents today for HOsp f/u  Admitted to [facility]  Full work up for syncope  PE   Heart scan  Neg  readmitted H for retention  Landin placed  Seen  this week  Restarted  flomax  Past DM  No med      Chief Complaint   Patient presents with   • ER F/U     [facility] difficulty with urination       HPI    PAST MEDICAL HISTORY:  Past Medical History:   Diagnosis Date   • Diabetes mellitus (CMS/HCC)    • Essential (primary) hypertension    • Prostate cancer metastatic to bone (CMS/HCC) 2020       MEDICATIONS:  Current Outpatient Medications   Medication Sig   • amLODIPine (NORVASC) 5 MG tablet Take 1 tablet by mouth daily.   • tamsulosin (FLOMAX) 0.4 MG Cap Take 1 capsule by mouth daily.   • Zoledronic Acid 4 MG Recon Soln Inject 4 mg into the vein every 30 days.   • leuprolide, 6 month, (ELIGARD) 45 MG injection Inject 45 mg into the skin every 6 months.     No current facility-administered medications for this visit.        ALLERGIES:  ALLERGIES:  No Known Allergies    PAST SURGICAL HISTORY:  History reviewed. No pertinent surgical history.    FAMILY HISTORY:  Family History   Family history unknown: Yes       SOCIAL HISTORY:  Social History     Tobacco Use   • Smoking status: Former Smoker     Packs/day: 0.00     Years: 60.00     Pack years: 0.00     Types: Cigarettes   • Smokeless tobacco: Never Used   Substance Use Topics   • Alcohol use: Never     Frequency: Never     Comment: occasional   • Drug use: Never       Review of Systems   Respiratory: Negative.    Cardiovascular: Negative.        OBJECTIVE:     Physical Exam   Constitutional: No distress.   Cardiovascular: Normal heart sounds.   Pulmonary/Chest: Effort normal.   Abdominal: Soft.   Musculoskeletal:         General: No edema.   Neurological: Gait normal.   Psychiatric: Affect  normal.       Visit Vitals  /75   Pulse 86   Ht 5' 8\" (1.727 m)   Wt 82.1 kg (181 lb)   BMI 27.52 kg/m²       DIAGNOSTIC STUDIES:   LAB RESULTS:  Admission on 03/10/2021, Discharged on 03/10/2021   Component Date Value Ref Range Status   • COLOR, URINALYSIS 03/10/2021 Yellow   Final   • APPEARANCE, URINALYSIS 03/10/2021 Clear   Final   • GLUCOSE, URINALYSIS 03/10/2021 Negative  Negative mg/dL Final   • BILIRUBIN, URINALYSIS 03/10/2021 Negative  Negative Final   • KETONES, URINALYSIS 03/10/2021 Negative  Negative mg/dL Final   • SPECIFIC GRAVITY, URINALYSIS 03/10/2021 1.020  1.005 - 1.030 Final   • OCCULT BLOOD, URINALYSIS 03/10/2021 Negative  Negative Final   • PH, URINALYSIS 03/10/2021 6.0  5.0 - 7.0 Final   • PROTEIN, URINALYSIS 03/10/2021 Negative  Negative mg/dL Final   • UROBILINOGEN, URINALYSIS 03/10/2021 0.2  0.2, 1.0 mg/dL Final   • NITRITE, URINALYSIS 03/10/2021 Negative  Negative Final   • LEUKOCYTE ESTERASE, URINALYSIS 03/10/2021 Negative  Negative Final   • SQUAMOUS EPITHELIAL, URINALYSIS 03/10/2021 1 to 5  None Seen, 1 to 5 /hpf Final   • ERYTHROCYTES, URINALYSIS 03/10/2021 1 to 2  None Seen, 1 to 2 /hpf Final   • LEUKOCYTES, URINALYSIS 03/10/2021 1 to 5  None Seen, 1 to 5 /hpf Final   • BACTERIA, URINALYSIS 03/10/2021 Few* None Seen /hpf Final   • HYALINE CASTS, URINALYSIS 03/10/2021 None Seen  None Seen, 1 to 5 /lpf Final   Admission on 02/21/2021, Discharged on 02/21/2021   Component Date Value Ref Range Status   • COLOR, URINALYSIS 02/21/2021 Yellow   Final   • APPEARANCE, URINALYSIS 02/21/2021 Clear   Final   • GLUCOSE, URINALYSIS 02/21/2021 Negative  Negative mg/dL Final   • BILIRUBIN, URINALYSIS 02/21/2021 Negative  Negative Final   • KETONES, URINALYSIS 02/21/2021 Negative  Negative mg/dL Final   • SPECIFIC GRAVITY, URINALYSIS 02/21/2021 1.020  1.005 - 1.030 Final   • OCCULT BLOOD, URINALYSIS 02/21/2021 Trace* Negative Final   • PH, URINALYSIS 02/21/2021 7.0  5.0 - 7.0 Final   • PROTEIN,  URINALYSIS 02/21/2021 Negative  Negative mg/dL Final   • UROBILINOGEN, URINALYSIS 02/21/2021 0.2  0.2, 1.0 mg/dL Final   • NITRITE, URINALYSIS 02/21/2021 Negative  Negative Final   • LEUKOCYTE ESTERASE, URINALYSIS 02/21/2021 Negative  Negative Final   • SQUAMOUS EPITHELIAL, URINALYSIS 02/21/2021 1 to 5  None Seen, 1 to 5 /hpf Final   • ERYTHROCYTES, URINALYSIS 02/21/2021 1 to 2  None Seen, 1 to 2 /hpf Final   • LEUKOCYTES, URINALYSIS 02/21/2021 1 to 5  None Seen, 1 to 5 /hpf Final   • BACTERIA, URINALYSIS 02/21/2021 Few* None Seen /hpf Final   • HYALINE CASTS, URINALYSIS 02/21/2021 None Seen  None Seen, 1 to 5 /lpf Final   • Extra Tube 02/21/2021 Hold for Add Ons   Final   • Extra Tube 02/21/2021 Hold for Add Ons   Final   • Extra Tube 02/21/2021 Hold for Add Ons   Final       Assessment AND PLAN:   This is a 69 year old year-old male who presents with       Abdoul was seen today for er f/u.    Diagnoses and all orders for this visit:    Hospital discharge follow-up    Prostate cancer metastatic to bone (CMS/HCC)    Essential hypertension    Metastatic cancer to bone (CMS/HCC)        Problem List Items Addressed This Visit        Oncologic    Prostate cancer metastatic to bone (CMS/HCC)      Other Visit Diagnoses     Hospital discharge follow-up    -  Primary    Essential hypertension        Metastatic cancer to bone (CMS/HCC)          Inpatient discharge medications reconciled against current medication list    See gu  oncology    Please take medications as directed.      Instructions provided as documented in the AVS.    Return in about 3 months (around 6/11/2021).      The patient indicated understanding of the diagnosis and agreed with the plan of care.         220

## 2022-03-31 NOTE — ED PROVIDER NOTE - NS ED ATTENDING STATEMENT MOD
This was a shared visit with the JONNY. I reviewed and verified the documentation and independently performed the documented:

## 2022-03-31 NOTE — PATIENT PROFILE ADULT - FALL HARM RISK - UNIVERSAL INTERVENTIONS
Bed in lowest position, wheels locked, appropriate side rails in place/Call bell, personal items and telephone in reach/Instruct patient to call for assistance before getting out of bed or chair/Non-slip footwear when patient is out of bed/[place] to call system/Physically safe environment - no spills, clutter or unnecessary equipment/Purposeful Proactive Rounding/Room/bathroom lighting operational, light cord in reach

## 2022-03-31 NOTE — H&P ADULT - HISTORY OF PRESENT ILLNESS
21F no PMHx and PSHx appy presents with 6 weeks RUQ and epigastric pain, that got precipitously worse yesterday. Her pain was episodic, worse with eating, since giving birth. Yesterday into today her pain has gotten much worse and has been sharp and constant. Endorses n/v x1 in ED. Last flatus this AM, and last BM was loose and giuliano colored. Endorse dark, almost orange urine, denies other urinary sx. Denies f/c. Denies chest pain, endorses sob 2/2 abdominal pain.    In the ED she was afebrile and nontachycardic. Her WBC 8.7, Tbili 0.6, , , . RUQ US showed "Distended gallbladder with single stone measuring 0.4 cm. Positive Hall's. No gallbladder wall thickening or pericholecystic fluid." CT scan showed, "Mild symmetric bilobar intrahepatic duct dilatation. Common bile duct measures 1.4 cm and is dilated in the pancreatic portion up to 0.8 cm. No radiopaque biliary stones seen."

## 2022-03-31 NOTE — ED ADULT TRIAGE NOTE - CHIEF COMPLAINT QUOTE
Pt BIBEMS complaining of right upper abd pain radiating to back x 1 month. Pt states that she had a vaginal delivery 2/12.

## 2022-03-31 NOTE — ED PROVIDER NOTE - CLINICAL SUMMARY MEDICAL DECISION MAKING FREE TEXT BOX
pt presents with c/o RUQ and epigastric abd pain with nausea, vomiting, light stool, and dark urine. symptoms since giving birth 6 weeks ago, worsening in frequency and severity. persistent pain and vomiting in ED. labs with elevated LFTs. CT and US show dilated CBD with likely choledocholithiasis. will admit to surgery, Dr. Argueta.

## 2022-03-31 NOTE — ED PROVIDER NOTE - OBJECTIVE STATEMENT
20yo F s/p appy presents with c/o RUQ and epigastric pain, episodic, worse with eating, x 6 weeks since delivering her child. notes this week the pain has gotten much worse and has been constant since last night. reports nausea, vomiting, light stool, dark urine. no prior similar episodes. denies fever, chills, diarrhea, melena, hematochezia, dysuria, hematuria, any other concerns.

## 2022-03-31 NOTE — ED ADULT NURSE NOTE - OBJECTIVE STATEMENT
Pt presents to ED complaining of intermittent RUQ abd pain since giving birth 2/12 with associated NVD and radiation to the R flank and back.  Denies diff w urination. Pos pain on palp. No fevers. No significant medical hx.

## 2022-03-31 NOTE — H&P ADULT - ASSESSMENT
21F s/p appy presents for 6 weeks of RUQ pain that acutely worsened x1d. Also complaining of acholic stool and dark urine today. WBC and Tbili wnl with mildly elevated ALP/AST/ALT. US and CT findings are consistent with choledocholithiasis, with CBD diameter up to 1.4cm and 4mm gallstone, without evidence of acute cholecystitis.    NPO/IVF  Pain/nausea PRN  OOBA  SCDs/SQH  MRCP  GI c/s for ERCP w/ Dr. Thompson  AM labs  Plan for OR after ERCP  Admit to general surgery, regional, Dr. Apple

## 2022-03-31 NOTE — H&P ADULT - NSHPPHYSICALEXAM_GEN_ALL_CORE
GEN: NAD, resting comfortably in bed  HEENT: MMM  CV: RRR  Resp: nonlabored breathing, no respiratory distress  Abd: soft, moderate RUQ and epigastric TTP, nondistended  Ext: WWP, no edema, no calf tenderness  Neuro: no focal deficits  Psych: pleasant

## 2022-04-01 ENCOUNTER — TRANSCRIPTION ENCOUNTER (OUTPATIENT)
Age: 22
End: 2022-04-01

## 2022-04-01 LAB
ALBUMIN SERPL ELPH-MCNC: 3.4 G/DL — SIGNIFICANT CHANGE UP (ref 3.3–5)
ALP SERPL-CCNC: 102 U/L — SIGNIFICANT CHANGE UP (ref 40–120)
ALT FLD-CCNC: 81 U/L — HIGH (ref 10–45)
ANION GAP SERPL CALC-SCNC: 9 MMOL/L — SIGNIFICANT CHANGE UP (ref 5–17)
APTT BLD: 32.4 SEC — SIGNIFICANT CHANGE UP (ref 27.5–35.5)
AST SERPL-CCNC: 44 U/L — HIGH (ref 10–40)
BASOPHILS # BLD AUTO: 0.03 K/UL — SIGNIFICANT CHANGE UP (ref 0–0.2)
BASOPHILS NFR BLD AUTO: 0.4 % — SIGNIFICANT CHANGE UP (ref 0–2)
BILIRUB SERPL-MCNC: 0.4 MG/DL — SIGNIFICANT CHANGE UP (ref 0.2–1.2)
BUN SERPL-MCNC: 7 MG/DL — SIGNIFICANT CHANGE UP (ref 7–23)
CALCIUM SERPL-MCNC: 8.2 MG/DL — LOW (ref 8.4–10.5)
CHLORIDE SERPL-SCNC: 109 MMOL/L — HIGH (ref 96–108)
CO2 SERPL-SCNC: 23 MMOL/L — SIGNIFICANT CHANGE UP (ref 22–31)
CREAT SERPL-MCNC: 0.9 MG/DL — SIGNIFICANT CHANGE UP (ref 0.5–1.3)
EGFR: 92 ML/MIN/1.73M2 — SIGNIFICANT CHANGE UP
EOSINOPHIL # BLD AUTO: 0.26 K/UL — SIGNIFICANT CHANGE UP (ref 0–0.5)
EOSINOPHIL NFR BLD AUTO: 3.4 % — SIGNIFICANT CHANGE UP (ref 0–6)
GLUCOSE SERPL-MCNC: 84 MG/DL — SIGNIFICANT CHANGE UP (ref 70–99)
HCT VFR BLD CALC: 31.8 % — LOW (ref 34.5–45)
HGB BLD-MCNC: 9.9 G/DL — LOW (ref 11.5–15.5)
IMM GRANULOCYTES NFR BLD AUTO: 0.3 % — SIGNIFICANT CHANGE UP (ref 0–1.5)
INR BLD: 1.07 — SIGNIFICANT CHANGE UP (ref 0.88–1.16)
LYMPHOCYTES # BLD AUTO: 2.68 K/UL — SIGNIFICANT CHANGE UP (ref 1–3.3)
LYMPHOCYTES # BLD AUTO: 35.2 % — SIGNIFICANT CHANGE UP (ref 13–44)
MAGNESIUM SERPL-MCNC: 2.2 MG/DL — SIGNIFICANT CHANGE UP (ref 1.6–2.6)
MCHC RBC-ENTMCNC: 26.3 PG — LOW (ref 27–34)
MCHC RBC-ENTMCNC: 31.1 GM/DL — LOW (ref 32–36)
MCV RBC AUTO: 84.4 FL — SIGNIFICANT CHANGE UP (ref 80–100)
MONOCYTES # BLD AUTO: 0.38 K/UL — SIGNIFICANT CHANGE UP (ref 0–0.9)
MONOCYTES NFR BLD AUTO: 5 % — SIGNIFICANT CHANGE UP (ref 2–14)
NEUTROPHILS # BLD AUTO: 4.24 K/UL — SIGNIFICANT CHANGE UP (ref 1.8–7.4)
NEUTROPHILS NFR BLD AUTO: 55.7 % — SIGNIFICANT CHANGE UP (ref 43–77)
NRBC # BLD: 0 /100 WBCS — SIGNIFICANT CHANGE UP (ref 0–0)
PHOSPHATE SERPL-MCNC: 4.5 MG/DL — SIGNIFICANT CHANGE UP (ref 2.5–4.5)
PLATELET # BLD AUTO: 262 K/UL — SIGNIFICANT CHANGE UP (ref 150–400)
POTASSIUM SERPL-MCNC: 4.4 MMOL/L — SIGNIFICANT CHANGE UP (ref 3.5–5.3)
POTASSIUM SERPL-SCNC: 4.4 MMOL/L — SIGNIFICANT CHANGE UP (ref 3.5–5.3)
PROT SERPL-MCNC: 5.8 G/DL — LOW (ref 6–8.3)
PROTHROM AB SERPL-ACNC: 12.7 SEC — SIGNIFICANT CHANGE UP (ref 10.5–13.4)
RBC # BLD: 3.77 M/UL — LOW (ref 3.8–5.2)
RBC # FLD: 16 % — HIGH (ref 10.3–14.5)
SODIUM SERPL-SCNC: 141 MMOL/L — SIGNIFICANT CHANGE UP (ref 135–145)
WBC # BLD: 7.61 K/UL — SIGNIFICANT CHANGE UP (ref 3.8–10.5)
WBC # FLD AUTO: 7.61 K/UL — SIGNIFICANT CHANGE UP (ref 3.8–10.5)

## 2022-04-01 DEVICE — GWIRE .25 X 450CM STRT: Type: IMPLANTABLE DEVICE | Status: FUNCTIONAL

## 2022-04-01 DEVICE — CATH 3 LUMEN EXTRACTIN BALLOON 15MM: Type: IMPLANTABLE DEVICE | Status: FUNCTIONAL

## 2022-04-01 RX ORDER — METRONIDAZOLE 500 MG
500 TABLET ORAL EVERY 8 HOURS
Refills: 0 | Status: DISCONTINUED | OUTPATIENT
Start: 2022-04-01 | End: 2022-04-02

## 2022-04-01 RX ORDER — ACETAMINOPHEN 500 MG
1000 TABLET ORAL EVERY 6 HOURS
Refills: 0 | Status: DISCONTINUED | OUTPATIENT
Start: 2022-04-01 | End: 2022-04-03

## 2022-04-01 RX ORDER — HEPARIN SODIUM 5000 [USP'U]/ML
7500 INJECTION INTRAVENOUS; SUBCUTANEOUS EVERY 8 HOURS
Refills: 0 | Status: DISCONTINUED | OUTPATIENT
Start: 2022-04-01 | End: 2022-04-02

## 2022-04-01 RX ORDER — CEFTRIAXONE 500 MG/1
1000 INJECTION, POWDER, FOR SOLUTION INTRAMUSCULAR; INTRAVENOUS EVERY 24 HOURS
Refills: 0 | Status: DISCONTINUED | OUTPATIENT
Start: 2022-04-01 | End: 2022-04-02

## 2022-04-01 RX ADMIN — CEFTRIAXONE 100 MILLIGRAM(S): 500 INJECTION, POWDER, FOR SOLUTION INTRAMUSCULAR; INTRAVENOUS at 18:46

## 2022-04-01 RX ADMIN — Medication 100 MILLIGRAM(S): at 22:06

## 2022-04-01 RX ADMIN — Medication 100 MILLIGRAM(S): at 15:27

## 2022-04-01 RX ADMIN — Medication 1000 MILLIGRAM(S): at 21:41

## 2022-04-01 RX ADMIN — HEPARIN SODIUM 7500 UNIT(S): 5000 INJECTION INTRAVENOUS; SUBCUTANEOUS at 22:05

## 2022-04-01 RX ADMIN — Medication 1000 MILLIGRAM(S): at 22:40

## 2022-04-01 RX ADMIN — SODIUM CHLORIDE 140 MILLILITER(S): 9 INJECTION, SOLUTION INTRAVENOUS at 11:11

## 2022-04-01 RX ADMIN — SODIUM CHLORIDE 200 MILLILITER(S): 9 INJECTION, SOLUTION INTRAVENOUS at 22:05

## 2022-04-01 RX ADMIN — Medication 100 MILLIGRAM(S): at 07:26

## 2022-04-01 NOTE — DISCHARGE NOTE PROVIDER - NSDCMRMEDTOKEN_GEN_ALL_CORE_FT
Colace 100 mg oral capsule: 1 cap(s) orally 2 times a day as needed for constipation  oxyCODONE 5 mg oral tablet: 1 tab(s) orally every 6 hours as needed for severe pain MDD:4

## 2022-04-01 NOTE — DISCHARGE NOTE PROVIDER - NSDCFUADDINST_GEN_ALL_CORE_FT
Surgery Follow Up:  Please follow up with Dr. Powell in one week; you may call the office to make an appointment at your earliest convenience at [phone].     Gastroenterology Follow Up:  Please follow up with Dr. Thompson in 2-3 weeks; you may call the office to make an appointment at your earliest convenience.    General Discharge Instructions:  Please resume all regular home medications unless specifically advised not to take a particular medication. Also, please take any new medications as prescribed. Take 2 tabs tylenol every 6 hours as needed for pain management. You have also been prescribed oxycodone for pain not controlled by tylenol. Take 1 tabs as prescribed for severe pain. Take prescribed stool softener (colace) to prevent constipation caused by oxycodone  Please get plenty of rest, continue to ambulate several times per day, and drink adequate amounts of fluids. Avoid lifting weights greater than 5-10 lbs until you follow-up with your surgeon, who will instruct you further regarding activity restrictions.  Avoid driving or operating heavy machinery while taking pain medications.  Please follow-up with your surgeon and Primary Care Provider (PCP) as advised.  Incision Care:  *Please call your doctor if you have increased pain, swelling, redness, or drainage from the incision site.  *Avoid swimming and baths until your follow-up appointment.  *You may shower, and wash surgical incisions with a mild soap and warm water. Gently pat the area dry.    Warning Signs:  Please call your doctor if you experience the following:  *You experience new chest pain, pressure, squeezing or tightness.  *New or worsening cough, shortness of breath, or wheeze.  *If you are vomiting and cannot keep down fluids or your medications.  *You are getting dehydrated due to continued vomiting, diarrhea, or other reasons. Signs of dehydration include dry mouth, rapid heartbeat, or feeling dizzy or faint when standing.  *You see blood or dark/black material when you vomit or have a bowel movement.  *You experience burning when you urinate, have blood in your urine, or experience a discharge.  *Your pain is not improving within 8-12 hours or is not gone within 24 hours. Call or return immediately if your pain is getting worse, changes location, or moves to your chest or back.  *You have shaking chills, or fever greater than 101.5 degrees Fahrenheit or 38 degrees Celsius.  *Any change in your symptoms, or any new symptoms that concern you.

## 2022-04-01 NOTE — DISCHARGE NOTE PROVIDER - HOSPITAL COURSE
21F no PMHx and PSHx appy presented with 6 weeks RUQ and epigastric pain, that got precipitously x 1 day. In the ED she was afebrile and nontachycardic. Her WBC 8.7, Tbili 0.6, , , . RUQ US showed "Distended gallbladder with single stone measuring 0.4 cm. Positive Hall's. No gallbladder wall thickening or pericholecystic fluid." CT scan showed, "Mild symmetric bilobar intrahepatic duct dilatation. Common bile duct measures 1.4 cm and is dilated in the pancreatic portion up to 0.8 cm. No radiopaque biliary stones seen." Pt was admitted to general surgery and GI was consulted for ERCP......    ***INCOMPLETE** 21F no PMHx and PSHx appy presented with 6 weeks RUQ and epigastric pain, that got precipitously x 1 day. In the ED she was afebrile and nontachycardic. Her WBC 8.7, Tbili 0.6, , , . RUQ US showed "Distended gallbladder with single stone measuring 0.4 cm. Positive Hall's. No gallbladder wall thickening or pericholecystic fluid." CT scan showed, "Mild symmetric bilobar intrahepatic duct dilatation. Common bile duct measures 1.4 cm and is dilated in the pancreatic portion up to 0.8 cm. No radiopaque biliary stones seen." Pt was admitted to general surgery and GI was consulted and underwent ERCP on 4/1 w/ no filling defects, sludge and debris swept out, with limited sphincterotomy. On 4/2 pt underwent S/p robotic assisted cholecystectomy and umbilical hernia repair (4/2) Her postoperative course was unremarkable with advancement of diet, passing trial of void, and pain control. On day of discharge patient was stable to be d/c'd home.

## 2022-04-01 NOTE — CONSULT NOTE ADULT - ASSESSMENT
21yF w/remote history of appendectomy and recent child birth presenting w/6 weeks of RUQ pain, presenting with abnormal LTs and dilated CBD on imaging concerning for choledocholithiasis. GI consulted for abnormal imaging.    Dilated CBD - Seen on CT and US without overt stone or etiology of obstruction visualized. Patient with stone on US and distended gallbladder in addition to dilated CBD suggestive of obstruction secondary to likely choledocholithiasis. No leukocytosis, SIRS criteria, or organ dysfunction suggestive of acute cholangitis. LTs downtrending this morning possibly suggestive of passing stones/sludge.  - Maintain NPO at this time  - MRCP pending  - Patient added to end of day schedule for tentative ERCP    Recommendations discussed with primary team  Case discussed with attending physician

## 2022-04-01 NOTE — PROGRESS NOTE ADULT - ASSESSMENT
21F s/p appy presents for 6 weeks of RUQ pain that acutely worsened x1d. WBC and Tbili wnl with mildly elevated ALP/AST/ALT. US and CT findings are consistent with choledocholithiasis w/o e/o acute cholecystitis.    NPO/IVF  Pain/nausea PRN  OOBA  SCDs/SQH  MRCP  GI c/s for ERCP w/ Dr. Thompson  AM labs

## 2022-04-01 NOTE — CHART NOTE - NSCHARTNOTEFT_GEN_A_CORE
Patient had a/an ERCP with Dr. Thompson in the Endoscopy unit for abnormal imaging of the CBD. Please refer to "Results" tab or paper chart for full report. Cholangiogram with dilated CBD, no overt filling defect identified, cystic duct not immediately opacified. Limited sphincterotomy performed. Balloon sweep with removal of sludge and debris.    RECOMMENDATIONS  Clear liquid diet  Trend LTs  No further need for MRCP  Cholecystectomy per primary surgical team  Care otherwise as per primary surgical team

## 2022-04-01 NOTE — CONSULT NOTE ADULT - SUBJECTIVE AND OBJECTIVE BOX
GASTROENTEROLOGY CONSULT NOTE  HPI:  21F no PMHx and PSHx alexis presents with 6 weeks RUQ and epigastric pain, that got precipitously worse yesterday. Her pain was episodic, worse with eating, since giving birth. Yesterday into today her pain has gotten much worse and has been sharp and constant. Endorses n/v x1 in ED. Last flatus this AM, and last BM was loose and giuliano colored. Endorse dark, almost orange urine, denies other urinary sx. Denies f/c. Denies chest pain, endorses sob 2/2 abdominal pain.    In the ED she was afebrile and nontachycardic. Her WBC 8.7, Tbili 0.6, , , . RUQ US showed "Distended gallbladder with single stone measuring 0.4 cm. Positive Hall's. No gallbladder wall thickening or pericholecystic fluid." CT scan showed, "Mild symmetric bilobar intrahepatic duct dilatation. Common bile duct measures 1.4 cm and is dilated in the pancreatic portion up to 0.8 cm. No radiopaque biliary stones seen."     (31 Mar 2022 23:55)    Allergies    No Known Allergies    Intolerances      Home Medications:    MEDICATIONS:  MEDICATIONS  (STANDING):  cefTRIAXone   IVPB 1000 milliGRAM(s) IV Intermittent every 24 hours  influenza   Vaccine 0.5 milliLiter(s) IntraMuscular once  lactated ringers. 1000 milliLiter(s) (140 mL/Hr) IV Continuous <Continuous>  metroNIDAZOLE  IVPB 500 milliGRAM(s) IV Intermittent every 8 hours    MEDICATIONS  (PRN):  ondansetron Injectable 4 milliGRAM(s) IV Push every 6 hours PRN Nausea    PAST MEDICAL & SURGICAL HISTORY:  No pertinent past medical history    S/P appendectomy      FAMILY HISTORY:    SOCIAL HISTORY:  As above    REVIEW OF SYSTEMS:  CONSTITUTIONAL: No weakness, fevers or chills  HEENT: No visual changes; No vertigo or throat pain   NECK: No pain or stiffness  RESPIRATORY: No cough, wheezing, hemoptysis; No shortness of breath  CARDIOVASCULAR: No chest pain or palpitations  GASTROINTESTINAL: As above  GENITOURINARY: No dysuria, frequency or hematuria  NEUROLOGICAL: No numbness or weakness  SKIN: No itching, burning, rashes, or lesions   All other 10 review of systems is negative unless indicated above.    Vital Signs Last 24 Hrs  T(C): 37.1 (01 Apr 2022 09:21), Max: 37.1 (01 Apr 2022 09:21)  T(F): 98.8 (01 Apr 2022 09:21), Max: 98.8 (01 Apr 2022 09:21)  HR: 60 (01 Apr 2022 09:21) (60 - 84)  BP: 132/84 (01 Apr 2022 09:21) (100/67 - 137/81)  BP(mean): --  RR: 18 (01 Apr 2022 09:21) (17 - 18)  SpO2: 98% (01 Apr 2022 09:21) (96% - 99%)    03-31 @ 07:01  -  04-01 @ 07:00  --------------------------------------------------------  IN: 1540 mL / OUT: 700 mL / NET: 840 mL        PHYSICAL EXAM:    General: Well developed; well nourished; in no acute distress  Eyes: Anicteric sclerae, moist conjunctivae  HENT: Moist mucous membranes  Neck: Trachea midline, supple  Lungs: Normal respiratory effort, no intercostal retractions  Cardiovascular: RRR  Abdomen: Soft, non-tender non-distended; Normal bowel sounds; No rebound or guarding  Extremities: Normal range of motion, No clubbing, cyanosis or edema  Neurological: Alert and oriented x3  Skin: Warm and dry. No obvious rash    LABS:                        9.9    7.61  )-----------( 262      ( 01 Apr 2022 05:41 )             31.8     04-01    141  |  109<H>  |  7   ----------------------------<  84  4.4   |  23  |  0.90    Ca    8.2<L>      01 Apr 2022 05:41  Phos  4.5     04-01  Mg     2.2     04-01    TPro  5.8<L>  /  Alb  3.4  /  TBili  0.4  /  DBili  x   /  AST  44<H>  /  ALT  81<H>  /  AlkPhos  102  04-01        PT/INR - ( 01 Apr 2022 05:41 )   PT: 12.7 sec;   INR: 1.07          PTT - ( 01 Apr 2022 05:41 )  PTT:32.4 sec    RADIOLOGY & ADDITIONAL STUDIES:     Reviewed

## 2022-04-01 NOTE — DISCHARGE NOTE PROVIDER - INSTRUCTIONS
Please continue a low fat diet, this can include:  Cereals, whole grains, and whole grain pasta products  corn or whole wheat tortillas, baked crackers, noodles, especially whole grain versions, oatmeal, rice, English muffins    Dairy products can be high in fat, but food manufacturers often offer lower fat versions. These include: fat free cheese or yogurt or cream cheese    Protein sources: Tofu, beans, lentils, egg whites, lean cuts of meat, lentils, tuna, peas, shrimp, skinless chicken or turkey breast  veggie burgers, Fruits and vegetables are naturally low fat. Choose fresh, frozen, or canned options.

## 2022-04-01 NOTE — DISCHARGE NOTE PROVIDER - PROVIDER RX CONTACT NUMBER
Brief Postoperative Note      Patient: Sheryl Mohs  YOB: 2015  MRN: [id number]    Date of Procedure: 4/13/2021    Pre-Op Diagnosis: MULTIPLE CARIES    Post-Op Diagnosis: Same       Procedure(s):  COMPLETE ORAL AND DENTAL REHABILITATION    Surgeon(s): Alo Henderson DDS    Assistant:  * No surgical staff found *    Anesthesia: General    Estimated Blood Loss (mL): Minimal    Complications: None    Specimens:   * No specimens in log *    Implants:  * No implants in log *      Drains: * No LDAs found *    Findings: Dental caries.      Electronically signed by Alo Henderson DDS on 4/13/2021 at 10:36 AM
[phone]

## 2022-04-01 NOTE — DISCHARGE NOTE PROVIDER - CARE PROVIDER_API CALL
Rosalee Powell)  Surgery  [address], [place] [address]  Phone: [phone]  Fax: [phone]  Follow Up Time:     Jerrell Thompson  GASTROENTEROLOGY  [address], [address]  [place] [address]  Phone: [phone]  Fax: [phone]  Follow Up Time:

## 2022-04-01 NOTE — DISCHARGE NOTE PROVIDER - NSDCCPCAREPLAN_GEN_ALL_CORE_FT
PRINCIPAL DISCHARGE DIAGNOSIS  Diagnosis: Choledocholithiasis  Assessment and Plan of Treatment:

## 2022-04-01 NOTE — DISCHARGE NOTE PROVIDER - CARE PROVIDERS DIRECT ADDRESSES
[email or web address]

## 2022-04-02 ENCOUNTER — RESULT REVIEW (OUTPATIENT)
Age: 22
End: 2022-04-02

## 2022-04-02 LAB
ALBUMIN SERPL ELPH-MCNC: 3.8 G/DL — SIGNIFICANT CHANGE UP (ref 3.3–5)
ALP SERPL-CCNC: 106 U/L — SIGNIFICANT CHANGE UP (ref 40–120)
ALT FLD-CCNC: 63 U/L — HIGH (ref 10–45)
ANION GAP SERPL CALC-SCNC: 12 MMOL/L — SIGNIFICANT CHANGE UP (ref 5–17)
AST SERPL-CCNC: 23 U/L — SIGNIFICANT CHANGE UP (ref 10–40)
BILIRUB SERPL-MCNC: 0.3 MG/DL — SIGNIFICANT CHANGE UP (ref 0.2–1.2)
BUN SERPL-MCNC: 8 MG/DL — SIGNIFICANT CHANGE UP (ref 7–23)
CALCIUM SERPL-MCNC: 8.8 MG/DL — SIGNIFICANT CHANGE UP (ref 8.4–10.5)
CHLORIDE SERPL-SCNC: 107 MMOL/L — SIGNIFICANT CHANGE UP (ref 96–108)
CO2 SERPL-SCNC: 21 MMOL/L — LOW (ref 22–31)
CREAT SERPL-MCNC: 0.74 MG/DL — SIGNIFICANT CHANGE UP (ref 0.5–1.3)
CULTURE RESULTS: SIGNIFICANT CHANGE UP
EGFR: 117 ML/MIN/1.73M2 — SIGNIFICANT CHANGE UP
GLUCOSE SERPL-MCNC: 112 MG/DL — HIGH (ref 70–99)
HCT VFR BLD CALC: 34.2 % — LOW (ref 34.5–45)
HGB BLD-MCNC: 10.8 G/DL — LOW (ref 11.5–15.5)
MAGNESIUM SERPL-MCNC: 2 MG/DL — SIGNIFICANT CHANGE UP (ref 1.6–2.6)
MCHC RBC-ENTMCNC: 26.7 PG — LOW (ref 27–34)
MCHC RBC-ENTMCNC: 31.6 GM/DL — LOW (ref 32–36)
MCV RBC AUTO: 84.4 FL — SIGNIFICANT CHANGE UP (ref 80–100)
NRBC # BLD: 0 /100 WBCS — SIGNIFICANT CHANGE UP (ref 0–0)
PHOSPHATE SERPL-MCNC: 4.3 MG/DL — SIGNIFICANT CHANGE UP (ref 2.5–4.5)
PLATELET # BLD AUTO: 337 K/UL — SIGNIFICANT CHANGE UP (ref 150–400)
POTASSIUM SERPL-MCNC: 4.5 MMOL/L — SIGNIFICANT CHANGE UP (ref 3.5–5.3)
POTASSIUM SERPL-SCNC: 4.5 MMOL/L — SIGNIFICANT CHANGE UP (ref 3.5–5.3)
PROT SERPL-MCNC: 6.6 G/DL — SIGNIFICANT CHANGE UP (ref 6–8.3)
RBC # BLD: 4.05 M/UL — SIGNIFICANT CHANGE UP (ref 3.8–5.2)
RBC # FLD: 15.6 % — HIGH (ref 10.3–14.5)
SODIUM SERPL-SCNC: 140 MMOL/L — SIGNIFICANT CHANGE UP (ref 135–145)
SPECIMEN SOURCE: SIGNIFICANT CHANGE UP
WBC # BLD: 7.14 K/UL — SIGNIFICANT CHANGE UP (ref 3.8–10.5)
WBC # FLD AUTO: 7.14 K/UL — SIGNIFICANT CHANGE UP (ref 3.8–10.5)

## 2022-04-02 PROCEDURE — 99232 SBSQ HOSP IP/OBS MODERATE 35: CPT | Mod: GC,57

## 2022-04-02 PROCEDURE — 47562 LAPAROSCOPIC CHOLECYSTECTOMY: CPT

## 2022-04-02 PROCEDURE — S2900 ROBOTIC SURGICAL SYSTEM: CPT | Mod: NC

## 2022-04-02 PROCEDURE — 88304 TISSUE EXAM BY PATHOLOGIST: CPT | Mod: 26

## 2022-04-02 PROCEDURE — 49585: CPT | Mod: 59

## 2022-04-02 PROCEDURE — 88302 TISSUE EXAM BY PATHOLOGIST: CPT | Mod: 26

## 2022-04-02 RX ORDER — HYDRALAZINE HCL 50 MG
5 TABLET ORAL ONCE
Refills: 0 | Status: DISCONTINUED | OUTPATIENT
Start: 2022-04-02 | End: 2022-04-03

## 2022-04-02 RX ORDER — OXYCODONE HYDROCHLORIDE 5 MG/1
5 TABLET ORAL EVERY 6 HOURS
Refills: 0 | Status: DISCONTINUED | OUTPATIENT
Start: 2022-04-02 | End: 2022-04-03

## 2022-04-02 RX ORDER — HYDROMORPHONE HYDROCHLORIDE 2 MG/ML
0.5 INJECTION INTRAMUSCULAR; INTRAVENOUS; SUBCUTANEOUS
Refills: 0 | Status: DISCONTINUED | OUTPATIENT
Start: 2022-04-02 | End: 2022-04-02

## 2022-04-02 RX ORDER — BUPIVACAINE 13.3 MG/ML
20 INJECTION, SUSPENSION, LIPOSOMAL INFILTRATION ONCE
Refills: 0 | Status: DISCONTINUED | OUTPATIENT
Start: 2022-04-02 | End: 2022-04-03

## 2022-04-02 RX ORDER — ACETAMINOPHEN 500 MG
1000 TABLET ORAL ONCE
Refills: 0 | Status: COMPLETED | OUTPATIENT
Start: 2022-04-02 | End: 2022-04-02

## 2022-04-02 RX ORDER — HEPARIN SODIUM 5000 [USP'U]/ML
7500 INJECTION INTRAVENOUS; SUBCUTANEOUS EVERY 8 HOURS
Refills: 0 | Status: DISCONTINUED | OUTPATIENT
Start: 2022-04-02 | End: 2022-04-03

## 2022-04-02 RX ORDER — OXYCODONE HYDROCHLORIDE 5 MG/1
10 TABLET ORAL EVERY 6 HOURS
Refills: 0 | Status: DISCONTINUED | OUTPATIENT
Start: 2022-04-02 | End: 2022-04-03

## 2022-04-02 RX ADMIN — HEPARIN SODIUM 7500 UNIT(S): 5000 INJECTION INTRAVENOUS; SUBCUTANEOUS at 06:11

## 2022-04-02 RX ADMIN — Medication 1000 MILLIGRAM(S): at 16:15

## 2022-04-02 RX ADMIN — SODIUM CHLORIDE 80 MILLILITER(S): 9 INJECTION, SOLUTION INTRAVENOUS at 22:11

## 2022-04-02 RX ADMIN — HEPARIN SODIUM 7500 UNIT(S): 5000 INJECTION INTRAVENOUS; SUBCUTANEOUS at 22:05

## 2022-04-02 RX ADMIN — OXYCODONE HYDROCHLORIDE 10 MILLIGRAM(S): 5 TABLET ORAL at 18:13

## 2022-04-02 RX ADMIN — Medication 1000 MILLIGRAM(S): at 16:13

## 2022-04-02 RX ADMIN — HYDROMORPHONE HYDROCHLORIDE 0.5 MILLIGRAM(S): 2 INJECTION INTRAMUSCULAR; INTRAVENOUS; SUBCUTANEOUS at 13:59

## 2022-04-02 RX ADMIN — Medication 1000 MILLIGRAM(S): at 08:29

## 2022-04-02 RX ADMIN — Medication 1000 MILLIGRAM(S): at 14:29

## 2022-04-02 RX ADMIN — SODIUM CHLORIDE 140 MILLILITER(S): 9 INJECTION, SOLUTION INTRAVENOUS at 00:42

## 2022-04-02 RX ADMIN — HYDROMORPHONE HYDROCHLORIDE 0.5 MILLIGRAM(S): 2 INJECTION INTRAMUSCULAR; INTRAVENOUS; SUBCUTANEOUS at 14:29

## 2022-04-02 RX ADMIN — Medication 400 MILLIGRAM(S): at 13:59

## 2022-04-02 RX ADMIN — Medication 100 MILLIGRAM(S): at 06:10

## 2022-04-02 RX ADMIN — OXYCODONE HYDROCHLORIDE 10 MILLIGRAM(S): 5 TABLET ORAL at 18:18

## 2022-04-02 RX ADMIN — SODIUM CHLORIDE 140 MILLILITER(S): 9 INJECTION, SOLUTION INTRAVENOUS at 14:00

## 2022-04-02 RX ADMIN — SODIUM CHLORIDE 140 MILLILITER(S): 9 INJECTION, SOLUTION INTRAVENOUS at 03:30

## 2022-04-02 RX ADMIN — Medication 1000 MILLIGRAM(S): at 08:04

## 2022-04-02 NOTE — PROGRESS NOTE ADULT - ASSESSMENT
21yF w/remote history of appendectomy and recent child birth presenting w/6 weeks of RUQ pain, presenting with abnormal LTs and dilated CBD on imaging concerning for choledocholithiasis. GI consulted for abnormal imaging.    Dilated CBD - Seen on CT and US without overt stone or etiology of obstruction visualized. Patient with stone on US and distended gallbladder in addition to dilated CBD suggestive of obstruction secondary to likely choledocholithiasis. No leukocytosis, SIRS criteria, or organ dysfunction suggestive of acute cholangitis. LTs downtrending this morning possibly suggestive of passing stones/sludge.  - ERCP (4/1) Cholangiogram with dilated CBD, no overt filling defect identified, cystic duct not immediately opacified. Limited sphincterotomy performed. Balloon sweep with removal of sludge and debris.  -LTs continue to downtrend, still with residual epigastric/RUQ tenderness however improved from admission  -Pending OR for possible cholecystectomy today  -Remainder of management as per surgical team    Case discussed with [facility] attending and primary team.     Cristine Cai DO  Gastroenterology Fellow  Pager: [phone] 21yF w/remote history of appendectomy and recent child birth presenting w/6 weeks of RUQ pain, presenting with abnormal LTs and dilated CBD on imaging concerning for choledocholithiasis. GI consulted for abnormal imaging.    Dilated CBD - Seen on CT and US without overt stone or etiology of obstruction visualized. Patient with stone on US and distended gallbladder in addition to dilated CBD suggestive of obstruction secondary to likely choledocholithiasis. No leukocytosis, SIRS criteria, or organ dysfunction suggestive of acute cholangitis. LTs downtrending this morning possibly suggestive of passing stones/sludge.  - ERCP (4/1) Cholangiogram with dilated CBD, no overt filling defect identified, cystic duct not immediately opacified. Limited sphincterotomy performed. Balloon sweep with removal of sludge and debris.  -LTs continue to downtrend, still with residual epigastric/RUQ tenderness however improved from admission  -Pending OR for possible cholecystectomy today  -Remainder of management as per surgical team    Case discussed with c attending and primary team.     Thank you for allowing us to participate in the care of this patient.  GI will sign off. Please call back with any questions or concerns.     Cristine Cai DO  Gastroenterology Fellow  Pager: [phone]

## 2022-04-02 NOTE — PROGRESS NOTE ADULT - ASSESSMENT
21F s/p appy presents for 6 weeks of RUQ pain that acutely worsened x1d. WBC and Tbili wnl with mildly elevated ALP/AST/ALT. US and CT findings are consistent with choledocholithiasis w/o e/o acute cholecystitis. Now s/p ERCP w/ no filling defects, sludge and debris swept out, with limited sphincterotomy.    NPO/IVF  Pain/nausea PRN  OOBA  SCDs/SQH  GI c/s  AM labs  OR this admission   21F s/p appy presents for 6 weeks of RUQ pain that acutely worsened x1d. WBC and Tbili wnl with mildly elevated ALP/AST/ALT. US and CT findings are consistent with choledocholithiasis w/o e/o acute cholecystitis. Now s/p ERCP w/ no filling defects, sludge and debris swept out, with limited sphincterotomy.    NPO/IVF  Pain/nausea PRN  OOBA  SCDs/SQH  GI c/s  AM labs  Possible OR on this admission

## 2022-04-02 NOTE — BRIEF OPERATIVE NOTE - OPERATION/FINDINGS
Robotic ports docked using romie technique for umbilical port site. Dissection for critical view of safety started with hook electrocautery. Cystic duct avulsion, however was still able to to be clippedx3. Cystic artery clipped. Gallbladder dissected off of the fossa, gallbladder placed in endocatch bag prior to removal. RUQ irrigated. Hemostasis achieved. Robotic ports removed under visualization with no noted port site bleeding. Gallbladder removed. Umbilical port site and prior hernia site closed with Maxxon figure of 8 stitch. Vicryl stitch used to tack umbilical stalk down to fascia after excision of hernia sac. Skin closed with monocryl and steri strips.

## 2022-04-02 NOTE — BRIEF OPERATIVE NOTE - NSICDXBRIEFPROCEDURE_GEN_ALL_CORE_FT
PROCEDURES:  Robot-assisted laparoscopic cholecystectomy 02-Apr-2022 13:58:37 with ICG evaluation of ducts Zoya Camarillo

## 2022-04-02 NOTE — PROGRESS NOTE ADULT - ATTENDING COMMENTS
No acute issues.  Labs are noted.  MRCP is pending.  GI opinion is noted. Continue current care.
Patient seen and examined in pre-op area on 4/2/2022.  Reported feeling improved since admission, but stated having some gas pain post ERCP.  Abdomen soft, NT.    Labs, imaging reviewed.    To OR for robotic-assisted cholecystectomy.  Discussed risks, benefits, and alternatives to surgery with patient.

## 2022-04-03 ENCOUNTER — TRANSCRIPTION ENCOUNTER (OUTPATIENT)
Age: 22
End: 2022-04-03

## 2022-04-03 VITALS
SYSTOLIC BLOOD PRESSURE: 114 MMHG | OXYGEN SATURATION: 97 % | RESPIRATION RATE: 17 BRPM | DIASTOLIC BLOOD PRESSURE: 80 MMHG | HEART RATE: 60 BPM | TEMPERATURE: 98 F

## 2022-04-03 LAB
ALBUMIN SERPL ELPH-MCNC: 3.2 G/DL — LOW (ref 3.3–5)
ALP SERPL-CCNC: 78 U/L — SIGNIFICANT CHANGE UP (ref 40–120)
ALT FLD-CCNC: 39 U/L — SIGNIFICANT CHANGE UP (ref 10–45)
ANION GAP SERPL CALC-SCNC: 12 MMOL/L — SIGNIFICANT CHANGE UP (ref 5–17)
AST SERPL-CCNC: 15 U/L — SIGNIFICANT CHANGE UP (ref 10–40)
BILIRUB SERPL-MCNC: 0.2 MG/DL — SIGNIFICANT CHANGE UP (ref 0.2–1.2)
BUN SERPL-MCNC: 9 MG/DL — SIGNIFICANT CHANGE UP (ref 7–23)
CALCIUM SERPL-MCNC: 8.2 MG/DL — LOW (ref 8.4–10.5)
CHLORIDE SERPL-SCNC: 107 MMOL/L — SIGNIFICANT CHANGE UP (ref 96–108)
CO2 SERPL-SCNC: 21 MMOL/L — LOW (ref 22–31)
CREAT SERPL-MCNC: 0.86 MG/DL — SIGNIFICANT CHANGE UP (ref 0.5–1.3)
EGFR: 97 ML/MIN/1.73M2 — SIGNIFICANT CHANGE UP
GLUCOSE SERPL-MCNC: 94 MG/DL — SIGNIFICANT CHANGE UP (ref 70–99)
HCT VFR BLD CALC: 28.1 % — LOW (ref 34.5–45)
HCT VFR BLD CALC: 28.8 % — LOW (ref 34.5–45)
HGB BLD-MCNC: 8.8 G/DL — LOW (ref 11.5–15.5)
HGB BLD-MCNC: 8.9 G/DL — LOW (ref 11.5–15.5)
MAGNESIUM SERPL-MCNC: 1.7 MG/DL — SIGNIFICANT CHANGE UP (ref 1.6–2.6)
MCHC RBC-ENTMCNC: 26.5 PG — LOW (ref 27–34)
MCHC RBC-ENTMCNC: 26.6 PG — LOW (ref 27–34)
MCHC RBC-ENTMCNC: 30.9 GM/DL — LOW (ref 32–36)
MCHC RBC-ENTMCNC: 31.3 GM/DL — LOW (ref 32–36)
MCV RBC AUTO: 84.6 FL — SIGNIFICANT CHANGE UP (ref 80–100)
MCV RBC AUTO: 86.2 FL — SIGNIFICANT CHANGE UP (ref 80–100)
NRBC # BLD: 0 /100 WBCS — SIGNIFICANT CHANGE UP (ref 0–0)
NRBC # BLD: 0 /100 WBCS — SIGNIFICANT CHANGE UP (ref 0–0)
PHOSPHATE SERPL-MCNC: 3.3 MG/DL — SIGNIFICANT CHANGE UP (ref 2.5–4.5)
PLATELET # BLD AUTO: 266 K/UL — SIGNIFICANT CHANGE UP (ref 150–400)
PLATELET # BLD AUTO: 280 K/UL — SIGNIFICANT CHANGE UP (ref 150–400)
POTASSIUM SERPL-MCNC: 3.9 MMOL/L — SIGNIFICANT CHANGE UP (ref 3.5–5.3)
POTASSIUM SERPL-SCNC: 3.9 MMOL/L — SIGNIFICANT CHANGE UP (ref 3.5–5.3)
PROT SERPL-MCNC: 5.5 G/DL — LOW (ref 6–8.3)
RBC # BLD: 3.32 M/UL — LOW (ref 3.8–5.2)
RBC # BLD: 3.34 M/UL — LOW (ref 3.8–5.2)
RBC # FLD: 16.3 % — HIGH (ref 10.3–14.5)
RBC # FLD: 16.5 % — HIGH (ref 10.3–14.5)
SODIUM SERPL-SCNC: 140 MMOL/L — SIGNIFICANT CHANGE UP (ref 135–145)
WBC # BLD: 10.08 K/UL — SIGNIFICANT CHANGE UP (ref 3.8–10.5)
WBC # BLD: 10.5 K/UL — SIGNIFICANT CHANGE UP (ref 3.8–10.5)
WBC # FLD AUTO: 10.08 K/UL — SIGNIFICANT CHANGE UP (ref 3.8–10.5)
WBC # FLD AUTO: 10.5 K/UL — SIGNIFICANT CHANGE UP (ref 3.8–10.5)

## 2022-04-03 RX ORDER — OXYCODONE HYDROCHLORIDE 5 MG/1
1 TABLET ORAL
Qty: 15 | Refills: 0
Start: 2022-04-03 | End: 2022-04-05

## 2022-04-03 RX ORDER — MAGNESIUM SULFATE 500 MG/ML
2 VIAL (ML) INJECTION ONCE
Refills: 0 | Status: COMPLETED | OUTPATIENT
Start: 2022-04-03 | End: 2022-04-03

## 2022-04-03 RX ORDER — DOCUSATE SODIUM 100 MG
1 CAPSULE ORAL
Qty: 20 | Refills: 0
Start: 2022-04-03 | End: 2022-04-12

## 2022-04-03 RX ORDER — POTASSIUM CHLORIDE 20 MEQ
20 PACKET (EA) ORAL ONCE
Refills: 0 | Status: COMPLETED | OUTPATIENT
Start: 2022-04-03 | End: 2022-04-03

## 2022-04-03 RX ADMIN — Medication 1000 MILLIGRAM(S): at 05:51

## 2022-04-03 RX ADMIN — HEPARIN SODIUM 7500 UNIT(S): 5000 INJECTION INTRAVENOUS; SUBCUTANEOUS at 05:48

## 2022-04-03 RX ADMIN — Medication 25 GRAM(S): at 08:51

## 2022-04-03 RX ADMIN — OXYCODONE HYDROCHLORIDE 10 MILLIGRAM(S): 5 TABLET ORAL at 10:49

## 2022-04-03 RX ADMIN — OXYCODONE HYDROCHLORIDE 10 MILLIGRAM(S): 5 TABLET ORAL at 11:01

## 2022-04-03 RX ADMIN — SODIUM CHLORIDE 80 MILLILITER(S): 9 INJECTION, SOLUTION INTRAVENOUS at 05:48

## 2022-04-03 RX ADMIN — Medication 1000 MILLIGRAM(S): at 06:45

## 2022-04-03 RX ADMIN — Medication 20 MILLIEQUIVALENT(S): at 10:42

## 2022-04-03 NOTE — PROGRESS NOTE ADULT - ASSESSMENT
21F s/p appy presents for 6 weeks of RUQ pain that acutely worsened x1d. WBC and Tbili wnl with mildly elevated ALP/AST/ALT. US and CT findings are consistent with choledocholithiasis w/o e/o acute cholecystitis. Now s/p ERCP w/ no filling defects, sludge and debris swept out, with limited sphincterotomy. S/p robotic assisted cholecystectomy and umbilical hernia repair (4/2)    Low fat diet  Pain/nausea PRN  OOBA  SCDs/SQH  AM labs showed hgb of 8.8 (10.8 yesterday)  plan to repeat CBC @ noon

## 2022-04-03 NOTE — PROGRESS NOTE ADULT - SUBJECTIVE AND OBJECTIVE BOX
SUBJECTIVE: Patient seen and examined bedside. Pt reports improved pain since admission. Intermittently nauseous without emesis, now well controlled. Denies flatus or BMs.    cefTRIAXone   IVPB 1000 milliGRAM(s) IV Intermittent every 24 hours  heparin   Injectable 7500 Unit(s) SubCutaneous every 8 hours  metroNIDAZOLE  IVPB 500 milliGRAM(s) IV Intermittent every 8 hours      Vital Signs Last 24 Hrs  T(C): 36.9 (2022 04:42), Max: 37.1 (2022 09:21)  T(F): 98.5 (2022 04:42), Max: 98.8 (2022 09:21)  HR: 60 (2022 04:42) (57 - 70)  BP: 129/79 (2022 04:42) (119/79 - 140/80)  BP(mean): --  RR: 17 (2022 04:42) (17 - 18)  SpO2: 97% (2022 04:42) (95% - 98%)  I&O's Detail    2022 07:01  -  2022 07:00  --------------------------------------------------------  IN:    Lactated Ringers: 2380 mL  Total IN: 2380 mL    OUT:    Voided (mL): 2500 mL  Total OUT: 2500 mL    Total NET: -120 mL          General: NAD, resting comfortably in bed  C/V: NSR  Pulm: Nonlabored breathing, no respiratory distress  Abd: soft, mildly distended, RUQ TTP, no rebound, no guarding  Extrem: WWP, no edema, SCDs in place        LABS:                        10.8   7.14  )-----------( 337      ( 2022 06:07 )             34.2     04-02    140  |  107  |  8   ----------------------------<  112<H>  4.5   |  21<L>  |  0.74    Ca    8.8      2022 06:07  Phos  4.3     04-02  Mg     2.0     04-    TPro  6.6  /  Alb  3.8  /  TBili  0.3  /  DBili  x   /  AST  23  /  ALT  63<H>  /  AlkPhos  106  -    PT/INR - ( 2022 05:41 )   PT: 12.7 sec;   INR: 1.07          PTT - ( 2022 05:41 )  PTT:32.4 sec  Urinalysis Basic - ( 31 Mar 2022 11:44 )    Color: Yellow / Appearance: SL CLOUDY / S.020 / pH: x  Gluc: x / Ketone: NEGATIVE  / Bili: NEGATIVE / Urobili: 0.2 E.U./dL   Blood: x / Protein: NEGATIVE mg/dL / Nitrite: NEGATIVE   Leuk Esterase: Small / RBC: < 5 /HPF / WBC > 10 /HPF   Sq Epi: x / Non Sq Epi: Moderate /HPF / Bacteria: Many /HPF        RADIOLOGY & ADDITIONAL STUDIES:  
  SUBJECTIVE: Pt seen and examined by chief resident. Pt is doing well, resting comfortably on bed. Pain controlled.  No nausea or vomiting. No complaints at this time.    Vital Signs Last 24 Hrs  T(C): 36.8 (2022 05:12), Max: 36.8 (31 Mar 2022 18:52)  T(F): 98.2 (2022 05:12), Max: 98.2 (31 Mar 2022 18:52)  HR: 65 (2022 05:12) (60 - 86)  BP: 119/83 (2022 05:12) (100/67 - 138/88)  RR: 17 (2022 05:12) (17 - 18)  SpO2: 97% (2022 05:12) (96% - 99%)    I&O's Summary    31 Mar 2022 07:01  -  2022 07:00  --------------------------------------------------------  IN: 1540 mL / OUT: 700 mL / NET: 840 mL    Physical Exam:  General Appearance: Appears well, NAD  Pulmonary: Nonlabored breathing, no respiratory distress  Abdomen: Soft, nondisteded, mildly tender in the RUQ on deep palpation  Extremities: WWP, SCD's in place     LABS:                        9.9    7.61  )-----------( 262      ( 2022 05:41 )             31.8     04-01    141  |  109<H>  |  7   ----------------------------<  84  4.4   |  23  |  0.90    Ca    8.2<L>      2022 05:41  Phos  4.5     04-01  Mg     2.2     04-01    TPro  5.8<L>  /  Alb  3.4  /  TBili  0.4  /  DBili  x   /  AST  44<H>  /  ALT  81<H>  /  AlkPhos  102  04-01    PT/INR - ( 2022 05:41 )   PT: 12.7 sec;   INR: 1.07          PTT - ( 2022 05:41 )  PTT:32.4 sec  Urinalysis Basic - ( 31 Mar 2022 11:44 )    Color: Yellow / Appearance: SL CLOUDY / S.020 / pH: x  Gluc: x / Ketone: NEGATIVE  / Bili: NEGATIVE / Urobili: 0.2 E.U./dL   Blood: x / Protein: NEGATIVE mg/dL / Nitrite: NEGATIVE   Leuk Esterase: Small / RBC: < 5 /HPF / WBC > 10 /HPF   Sq Epi: x / Non Sq Epi: Moderate /HPF / Bacteria: Many /HPF      
GASTROENTEROLOGY PROGRESS NOTE  Patient seen and examined at bedside. s/p ERCP () Cholangiogram with dilated CBD, no overt filling defect identified, cystic duct not immediately opacified. Limited sphincterotomy performed. Balloon sweep with removal of sludge and debris.  -LTs continue to downtrend    ROS: Patient reports some residual epigastric pain, noting 4/10 in severity (compared to 10/10 on admission). No nausea/vomiting.       PERTINENT REVIEW OF SYSTEMS:  CONSTITUTIONAL: No weakness, fevers or chills  HEENT: No visual changes; No vertigo or throat pain   GASTROINTESTINAL: As above.  NEUROLOGICAL: No numbness or weakness  SKIN: No itching, burning, rashes, or lesions     Allergies    No Known Allergies    Intolerances      MEDICATIONS:  MEDICATIONS  (STANDING):  cefTRIAXone   IVPB 1000 milliGRAM(s) IV Intermittent every 24 hours  heparin   Injectable 7500 Unit(s) SubCutaneous every 8 hours  influenza   Vaccine 0.5 milliLiter(s) IntraMuscular once  lactated ringers. 1000 milliLiter(s) (140 mL/Hr) IV Continuous <Continuous>  metroNIDAZOLE  IVPB 500 milliGRAM(s) IV Intermittent every 8 hours    MEDICATIONS  (PRN):  acetaminophen     Tablet .. 1000 milliGRAM(s) Oral every 6 hours PRN Temp greater or equal to 38C (100.4F), Mild Pain (1 - 3), Moderate Pain (4 - 6)  ondansetron Injectable 4 milliGRAM(s) IV Push every 6 hours PRN Nausea    Vital Signs Last 24 Hrs  T(C): 37.1 (2022 08:40), Max: 37.1 (2022 16:16)  T(F): 98.7 (2022 08:40), Max: 98.7 (2022 16:16)  HR: 57 (2022 08:40) (57 - 70)  BP: 147/87 (2022 08:40) (119/79 - 147/87)  BP(mean): --  RR: 17 (2022 08:40) (17 - 18)  SpO2: 100% (2022 08:40) (95% - 100%)    04- @ 07:01  -  -02 @ 07:00  --------------------------------------------------------  IN: 2380 mL / OUT: 2500 mL / NET: -120 mL     @ 07:01  -   @ 10:36  --------------------------------------------------------  IN: 560 mL / OUT: 0 mL / NET: 560 mL      PHYSICAL EXAM:    General: Obese female, lying in bed; in no acute distress  HEENT: MMM, conjunctiva and sclera clear  Gastrointestinal: Soft non-distended; epigastric/RUQ tenderness; No rebound or guarding  Skin: Warm and dry. No obvious rash    LABS:                        10.8   7.14  )-----------( 337      ( 2022 06:07 )             34.2     04-02    140  |  107  |  8   ----------------------------<  112<H>  4.5   |  21<L>  |  0.74    Ca    8.8      2022 06:07  Phos  4.3     04-02  Mg     2.0     -02    TPro  6.6  /  Alb  3.8  /  TBili  0.3  /  DBili  x   /  AST  23  /  ALT  63<H>  /  AlkPhos  106  04-02    PT/INR - ( 2022 05:41 )   PT: 12.7 sec;   INR: 1.07          PTT - ( 2022 05:41 )  PTT:32.4 sec      Urinalysis Basic - ( 31 Mar 2022 11:44 )    Color: Yellow / Appearance: SL CLOUDY / S.020 / pH: x  Gluc: x / Ketone: NEGATIVE  / Bili: NEGATIVE / Urobili: 0.2 E.U./dL   Blood: x / Protein: NEGATIVE mg/dL / Nitrite: NEGATIVE   Leuk Esterase: Small / RBC: < 5 /HPF / WBC > 10 /HPF   Sq Epi: x / Non Sq Epi: Moderate /HPF / Bacteria: Many /HPF                Culture - Urine (collected 31 Mar 2022 18:09)  Source: Clean Catch Clean Catch (Midstream)  Preliminary Report (2022 10:12):    >100,000 CFU/ml Gram positive organisms      RADIOLOGY & ADDITIONAL STUDIES:  Reviewed
POST-OPERATIVE NOTE    Procedure: Robot-assisted lap cholecystectomy    Diagnosis/Indication: symptomatic cholelithiasis    Surgeon: Dr. Powell    S: Pt seen and examined at bedside. Pt complaining of moderate RUQ incisional pain. Denies CP, SOB, FOLEY, calf tenderness. Denies nausea, vomiting.  O:  T(C): 36.5 (04-02-22 @ 15:52), Max: 36.5 (04-02-22 @ 13:48)  T(F): 97.7 (04-02-22 @ 15:52), Max: 97.7 (04-02-22 @ 13:48)  HR: 49 (04-02-22 @ 15:52) (49 - 68)  BP: 136/90 (04-02-22 @ 15:52) (95/54 - 142/63)  RR: 18 (04-02-22 @ 15:52) (15 - 23)  SpO2: 98% (04-02-22 @ 15:52) (96% - 98%)  Wt(kg): --                        10.8   7.14  )-----------( 337      ( 02 Apr 2022 06:07 )             34.2     04-02    140  |  107  |  8   ----------------------------<  112<H>  4.5   |  21<L>  |  0.74    Ca    8.8      02 Apr 2022 06:07  Phos  4.3     04-02  Mg     2.0     04-02    TPro  6.6  /  Alb  3.8  /  TBili  0.3  /  DBili  x   /  AST  23  /  ALT  63<H>  /  AlkPhos  106  04-02      Gen: NAD, resting comfortably in bed  C/V: NSR  Pulm: Nonlabored breathing, no respiratory distress  Abd: soft, mildly distended, appropriate incisional TTP, incisions CDI, no rebound, no guarding  Extrem: WWP, no calf edema or tenderness, SCDs in place      A/P: 21y Female s/p above procedure  Diet: Low Fat  IVF:  Pain/nausea control  SQH/SCDs/OOBA/IS  Dispo pending pain control, PO tolerance, clinical improvement
STATUS POST:  Robot-assisted laparoscopic cholecystectomy    POST OPERATIVE DAY #: 1    SUBJECTIVE: Pt seen and examined by chief resident. Pt is doing well, resting comfortably on bed. Tolerating liquids. +F/-BM. No nausea or vomiting. Reporting pain meds are helping but some breakthrough pain    Vital Signs Last 24 Hrs  T(C): 36.9 (03 Apr 2022 05:10), Max: 37.1 (02 Apr 2022 11:00)  T(F): 98.5 (03 Apr 2022 05:10), Max: 98.7 (02 Apr 2022 11:00)  HR: 79 (03 Apr 2022 05:10) (53 - 79)  BP: 104/70 (03 Apr 2022 05:10) (95/54 - 143/73)  BP(mean): 89 (02 Apr 2022 15:03) (68 - 90)  RR: 18 (03 Apr 2022 05:10) (15 - 23)  SpO2: 96% (03 Apr 2022 05:10) (96% - 100%)    I&O's Summary    02 Apr 2022 07:01  -  03 Apr 2022 07:00  --------------------------------------------------------  IN: 3560 mL / OUT: 1900 mL / NET: 1660 mL    03 Apr 2022 07:01  -  03 Apr 2022 09:37  --------------------------------------------------------  IN: 290 mL / OUT: 400 mL / NET: -110 mL    Physical Exam:  General Appearance: Appears well, NAD  Pulmonary: Nonlabored breathing, no respiratory distress  Abdomen: Soft, nondisteded, appropriate incisional tenderness, incisions clean and dry and intact  Extremities: WWP, SCD's in place     LABS:                        8.8    10.50 )-----------( 266      ( 03 Apr 2022 07:17 )             28.1     04-03    140  |  107  |  9   ----------------------------<  94  3.9   |  21<L>  |  0.86    Ca    8.2<L>      03 Apr 2022 07:15  Phos  3.3     04-03  Mg     1.7     04-03    TPro  5.5<L>  /  Alb  3.2<L>  /  TBili  0.2  /  DBili  x   /  AST  15  /  ALT  39  /  AlkPhos  78  04-03

## 2022-04-03 NOTE — DISCHARGE NOTE NURSING/CASE MANAGEMENT/SOCIAL WORK - PATIENT PORTAL LINK FT
You can access the FollowMyHealth Patient Portal offered by [facility] by registering at the following website: [email or web address]. By joining [employer]’s FollowMyHealth portal, you will also be able to view your health information using other applications (apps) compatible with our system.

## 2022-04-03 NOTE — DISCHARGE NOTE NURSING/CASE MANAGEMENT/SOCIAL WORK - NSDCPNINST_GEN_ALL_CORE
follow instructions as written by MD. Do not scrub incision sites. Pat dry completely dry when out of the shower.

## 2022-04-03 NOTE — DISCHARGE NOTE NURSING/CASE MANAGEMENT/SOCIAL WORK - NSDCPEFALRISK_GEN_ALL_CORE
For information on Fall & Injury Prevention, visit: [email or web address] OR  [email or web address] OR  https://www.cdc.gov/steadi/patient.html

## 2022-04-12 ENCOUNTER — APPOINTMENT (OUTPATIENT)
Dept: SURGERY | Facility: CLINIC | Age: 22
End: 2022-04-12

## 2022-04-14 DIAGNOSIS — K42.9 UMBILICAL HERNIA WITHOUT OBSTRUCTION OR GANGRENE: ICD-10-CM

## 2022-04-14 DIAGNOSIS — K80.45 CALCULUS OF BILE DUCT WITH CHRONIC CHOLECYSTITIS WITH OBSTRUCTION: ICD-10-CM

## 2022-04-24 LAB — SURGICAL PATHOLOGY STUDY: SIGNIFICANT CHANGE UP

## 2023-08-08 ENCOUNTER — EMERGENCY (EMERGENCY)
Facility: HOSPITAL | Age: 23
LOS: 1 days | Discharge: HOME | End: 2023-08-08
Admitting: EMERGENCY MEDICINE
Payer: MEDICAID

## 2023-08-08 VITALS
WEIGHT: 205.03 LBS | SYSTOLIC BLOOD PRESSURE: 128 MMHG | HEIGHT: 61 IN | RESPIRATION RATE: 18 BRPM | HEART RATE: 64 BPM | OXYGEN SATURATION: 98 % | DIASTOLIC BLOOD PRESSURE: 74 MMHG | TEMPERATURE: 98 F

## 2023-08-08 VITALS
RESPIRATION RATE: 18 BRPM | TEMPERATURE: 98 F | DIASTOLIC BLOOD PRESSURE: 77 MMHG | OXYGEN SATURATION: 97 % | HEART RATE: 46 BPM | SYSTOLIC BLOOD PRESSURE: 123 MMHG

## 2023-08-08 DIAGNOSIS — R10.13 EPIGASTRIC PAIN: ICD-10-CM

## 2023-08-08 DIAGNOSIS — R19.7 DIARRHEA, UNSPECIFIED: ICD-10-CM

## 2023-08-08 DIAGNOSIS — Z90.49 ACQUIRED ABSENCE OF OTHER SPECIFIED PARTS OF DIGESTIVE TRACT: ICD-10-CM

## 2023-08-08 DIAGNOSIS — R11.2 NAUSEA WITH VOMITING, UNSPECIFIED: ICD-10-CM

## 2023-08-08 DIAGNOSIS — R10.12 LEFT UPPER QUADRANT PAIN: ICD-10-CM

## 2023-08-08 DIAGNOSIS — Z90.49 ACQUIRED ABSENCE OF OTHER SPECIFIED PARTS OF DIGESTIVE TRACT: Chronic | ICD-10-CM

## 2023-08-08 PROBLEM — Z78.9 OTHER SPECIFIED HEALTH STATUS: Chronic | Status: ACTIVE | Noted: 2022-03-31

## 2023-08-08 LAB
ALBUMIN SERPL ELPH-MCNC: 3.5 G/DL — SIGNIFICANT CHANGE UP (ref 3.4–5)
ALP SERPL-CCNC: 53 U/L — SIGNIFICANT CHANGE UP (ref 40–120)
ALT FLD-CCNC: 14 U/L — SIGNIFICANT CHANGE UP (ref 12–42)
ANION GAP SERPL CALC-SCNC: 10 MMOL/L — SIGNIFICANT CHANGE UP (ref 9–16)
APPEARANCE UR: ABNORMAL
AST SERPL-CCNC: 11 U/L — LOW (ref 15–37)
BACTERIA # UR AUTO: ABNORMAL /HPF
BASOPHILS # BLD AUTO: 0.03 K/UL — SIGNIFICANT CHANGE UP (ref 0–0.2)
BASOPHILS NFR BLD AUTO: 0.2 % — SIGNIFICANT CHANGE UP (ref 0–2)
BILIRUB SERPL-MCNC: 0.2 MG/DL — SIGNIFICANT CHANGE UP (ref 0.2–1.2)
BILIRUB UR-MCNC: NEGATIVE — SIGNIFICANT CHANGE UP
BUN SERPL-MCNC: 9 MG/DL — SIGNIFICANT CHANGE UP (ref 7–23)
CALCIUM SERPL-MCNC: 8.6 MG/DL — SIGNIFICANT CHANGE UP (ref 8.5–10.5)
CHLORIDE SERPL-SCNC: 107 MMOL/L — SIGNIFICANT CHANGE UP (ref 96–108)
CO2 SERPL-SCNC: 27 MMOL/L — SIGNIFICANT CHANGE UP (ref 22–31)
COLOR SPEC: YELLOW — SIGNIFICANT CHANGE UP
CREAT SERPL-MCNC: 0.79 MG/DL — SIGNIFICANT CHANGE UP (ref 0.5–1.3)
DIFF PNL FLD: ABNORMAL
EGFR: 106 ML/MIN/1.73M2 — SIGNIFICANT CHANGE UP
EOSINOPHIL # BLD AUTO: 0.23 K/UL — SIGNIFICANT CHANGE UP (ref 0–0.5)
EOSINOPHIL NFR BLD AUTO: 1.6 % — SIGNIFICANT CHANGE UP (ref 0–6)
GLUCOSE SERPL-MCNC: 104 MG/DL — HIGH (ref 70–99)
GLUCOSE UR QL: NEGATIVE MG/DL — SIGNIFICANT CHANGE UP
HCG UR QL: NEGATIVE — SIGNIFICANT CHANGE UP
HCT VFR BLD CALC: 38.7 % — SIGNIFICANT CHANGE UP (ref 34.5–45)
HGB BLD-MCNC: 12.5 G/DL — SIGNIFICANT CHANGE UP (ref 11.5–15.5)
IMM GRANULOCYTES NFR BLD AUTO: 0.3 % — SIGNIFICANT CHANGE UP (ref 0–0.9)
KETONES UR-MCNC: NEGATIVE MG/DL — SIGNIFICANT CHANGE UP
LACTATE BLDV-MCNC: 1.6 MMOL/L — SIGNIFICANT CHANGE UP (ref 0.5–2)
LEUKOCYTE ESTERASE UR-ACNC: ABNORMAL
LIDOCAIN IGE QN: 67 U/L — LOW (ref 73–393)
LYMPHOCYTES # BLD AUTO: 19.1 % — SIGNIFICANT CHANGE UP (ref 13–44)
LYMPHOCYTES # BLD AUTO: 2.81 K/UL — SIGNIFICANT CHANGE UP (ref 1–3.3)
MAGNESIUM SERPL-MCNC: 2 MG/DL — SIGNIFICANT CHANGE UP (ref 1.6–2.6)
MCHC RBC-ENTMCNC: 30.9 PG — SIGNIFICANT CHANGE UP (ref 27–34)
MCHC RBC-ENTMCNC: 32.3 GM/DL — SIGNIFICANT CHANGE UP (ref 32–36)
MCV RBC AUTO: 95.8 FL — SIGNIFICANT CHANGE UP (ref 80–100)
MONOCYTES # BLD AUTO: 0.7 K/UL — SIGNIFICANT CHANGE UP (ref 0–0.9)
MONOCYTES NFR BLD AUTO: 4.7 % — SIGNIFICANT CHANGE UP (ref 2–14)
NEUTROPHILS # BLD AUTO: 10.94 K/UL — HIGH (ref 1.8–7.4)
NEUTROPHILS NFR BLD AUTO: 74.1 % — SIGNIFICANT CHANGE UP (ref 43–77)
NITRITE UR-MCNC: NEGATIVE — SIGNIFICANT CHANGE UP
NRBC # BLD: 0 /100 WBCS — SIGNIFICANT CHANGE UP (ref 0–0)
PH UR: 6.5 — SIGNIFICANT CHANGE UP (ref 5–8)
PLATELET # BLD AUTO: 232 K/UL — SIGNIFICANT CHANGE UP (ref 150–400)
POTASSIUM SERPL-MCNC: 4 MMOL/L — SIGNIFICANT CHANGE UP (ref 3.5–5.3)
POTASSIUM SERPL-SCNC: 4 MMOL/L — SIGNIFICANT CHANGE UP (ref 3.5–5.3)
PROT SERPL-MCNC: 6.8 G/DL — SIGNIFICANT CHANGE UP (ref 6.4–8.2)
PROT UR-MCNC: ABNORMAL MG/DL
RBC # BLD: 4.04 M/UL — SIGNIFICANT CHANGE UP (ref 3.8–5.2)
RBC # FLD: 13.2 % — SIGNIFICANT CHANGE UP (ref 10.3–14.5)
RBC CASTS # UR COMP ASSIST: 11 /HPF — HIGH (ref 0–4)
SODIUM SERPL-SCNC: 144 MMOL/L — SIGNIFICANT CHANGE UP (ref 132–145)
SP GR SPEC: 1.02 — SIGNIFICANT CHANGE UP (ref 1–1.03)
UROBILINOGEN FLD QL: 1 MG/DL — SIGNIFICANT CHANGE UP (ref 0.2–1)
WBC # BLD: 14.75 K/UL — HIGH (ref 3.8–10.5)
WBC # FLD AUTO: 14.75 K/UL — HIGH (ref 3.8–10.5)
WBC UR QL: 41 /HPF — HIGH (ref 0–5)

## 2023-08-08 PROCEDURE — 74177 CT ABD & PELVIS W/CONTRAST: CPT | Mod: 26

## 2023-08-08 PROCEDURE — 76705 ECHO EXAM OF ABDOMEN: CPT | Mod: 26

## 2023-08-08 PROCEDURE — 99285 EMERGENCY DEPT VISIT HI MDM: CPT

## 2023-08-08 RX ORDER — FAMOTIDINE 10 MG/ML
1 INJECTION INTRAVENOUS
Qty: 30 | Refills: 0
Start: 2023-08-08 | End: 2023-09-06

## 2023-08-08 RX ORDER — MORPHINE SULFATE 50 MG/1
4 CAPSULE, EXTENDED RELEASE ORAL ONCE
Refills: 0 | Status: DISCONTINUED | OUTPATIENT
Start: 2023-08-08 | End: 2023-08-08

## 2023-08-08 RX ORDER — FAMOTIDINE 10 MG/ML
20 INJECTION INTRAVENOUS ONCE
Refills: 0 | Status: COMPLETED | OUTPATIENT
Start: 2023-08-08 | End: 2023-08-08

## 2023-08-08 RX ORDER — CEFUROXIME AXETIL 250 MG
1 TABLET ORAL
Qty: 20 | Refills: 0
Start: 2023-08-08 | End: 2023-08-17

## 2023-08-08 RX ORDER — LIDOCAINE 4 G/100G
20 CREAM TOPICAL ONCE
Refills: 0 | Status: COMPLETED | OUTPATIENT
Start: 2023-08-08 | End: 2023-08-08

## 2023-08-08 RX ORDER — ACETAMINOPHEN 500 MG
1000 TABLET ORAL ONCE
Refills: 0 | Status: COMPLETED | OUTPATIENT
Start: 2023-08-08 | End: 2023-08-08

## 2023-08-08 RX ORDER — SODIUM CHLORIDE 9 MG/ML
1000 INJECTION INTRAMUSCULAR; INTRAVENOUS; SUBCUTANEOUS ONCE
Refills: 0 | Status: COMPLETED | OUTPATIENT
Start: 2023-08-08 | End: 2023-08-08

## 2023-08-08 RX ORDER — CEFTRIAXONE 500 MG/1
1000 INJECTION, POWDER, FOR SOLUTION INTRAMUSCULAR; INTRAVENOUS ONCE
Refills: 0 | Status: COMPLETED | OUTPATIENT
Start: 2023-08-08 | End: 2023-08-08

## 2023-08-08 RX ORDER — KETOROLAC TROMETHAMINE 30 MG/ML
30 SYRINGE (ML) INJECTION ONCE
Refills: 0 | Status: DISCONTINUED | OUTPATIENT
Start: 2023-08-08 | End: 2023-08-08

## 2023-08-08 RX ORDER — SUCRALFATE 1 G
1 TABLET ORAL ONCE
Refills: 0 | Status: COMPLETED | OUTPATIENT
Start: 2023-08-08 | End: 2023-08-08

## 2023-08-08 RX ORDER — ONDANSETRON 8 MG/1
4 TABLET, FILM COATED ORAL ONCE
Refills: 0 | Status: COMPLETED | OUTPATIENT
Start: 2023-08-08 | End: 2023-08-08

## 2023-08-08 RX ADMIN — Medication 30 MILLIGRAM(S): at 14:32

## 2023-08-08 RX ADMIN — ONDANSETRON 4 MILLIGRAM(S): 8 TABLET, FILM COATED ORAL at 11:17

## 2023-08-08 RX ADMIN — LIDOCAINE 20 MILLILITER(S): 4 CREAM TOPICAL at 15:58

## 2023-08-08 RX ADMIN — Medication 30 MILLILITER(S): at 11:17

## 2023-08-08 RX ADMIN — Medication 1 GRAM(S): at 15:58

## 2023-08-08 RX ADMIN — Medication 5 MILLILITER(S): at 15:58

## 2023-08-08 RX ADMIN — CEFTRIAXONE 100 MILLIGRAM(S): 500 INJECTION, POWDER, FOR SOLUTION INTRAMUSCULAR; INTRAVENOUS at 14:31

## 2023-08-08 RX ADMIN — FAMOTIDINE 20 MILLIGRAM(S): 10 INJECTION INTRAVENOUS at 11:17

## 2023-08-08 RX ADMIN — Medication 10 MILLIGRAM(S): at 15:58

## 2023-08-08 RX ADMIN — Medication 400 MILLIGRAM(S): at 14:31

## 2023-08-08 RX ADMIN — SODIUM CHLORIDE 1000 MILLILITER(S): 9 INJECTION INTRAMUSCULAR; INTRAVENOUS; SUBCUTANEOUS at 11:17

## 2023-08-08 RX ADMIN — MORPHINE SULFATE 4 MILLIGRAM(S): 50 CAPSULE, EXTENDED RELEASE ORAL at 11:49

## 2023-08-08 NOTE — ED PROVIDER NOTE - PROGRESS NOTE DETAILS
gastritis vs pyelonephritis vs choledocholithiasis  will send ceftrin x 10 days for pyelonephritis as +UA, urine culture pos.   imaging shows mildly dilated CBD, no stone visualized, normal LFTs. afebrile, no leukocytosis, discussed need for outpatient workup. patient's pain improved with GI cocktail in ED, will rx pepcid and discussed lifestyle changes for possible gastritis. d/c home with partner    patient feeling better and asking to be discharged. she is tolerating po. we discussed return precautions. gastritis vs pyelonephritis vs choledocholithiasis  will send ceftrin x 10 days for pyelonephritis as +UA, urine culture pos.   imaging shows mildly dilated CBD, no stone visualized, normal LFTs. afebrile, no leukocytosis, discussed need for outpatient workup with GI. patient's pain improved with GI cocktail in ED, will rx pepcid and discussed lifestyle changes for possible gastritis. d/c home with partner    patient feeling better and asking to be discharged. she is tolerating po. we discussed return precautions.

## 2023-08-08 NOTE — ED PROVIDER NOTE - OBJECTIVE STATEMENT
24 yo female with pmhx cholecystectomy and appendectomy presents c/o epigastric and LUQ pain radiating to left flank x 2 days with assoc nausea, vomiting and watery diarrhea. no fever/chills. patient denies hx gastritis or PUD. no melena or hematochezia.

## 2023-08-08 NOTE — ED PROVIDER NOTE - CLINICAL SUMMARY MEDICAL DECISION MAKING FREE TEXT BOX
22 yo female hx cholecystectomy with epigastic/LUQ and left flank pain x 2 days with assoc nausea/vomiting/diarrhea. differential includes viral gastroenteritis vs gastritis vs choledocholithiasis vs pancreatitis vs pyelonephritis. will check labs, IVF, GI cocktail, consider imaging for persistent symptoms.

## 2023-08-08 NOTE — ED ADULT TRIAGE NOTE - CHIEF COMPLAINT QUOTE
Pt BIBA c/o LUQ abdominal pain radiating to the L flank for 2 days. Pt also c/o N/V/D. PMH of appendectomy and cholecystectomy. Last normal BM 2 days ago, LMP 3 wks ago

## 2023-08-08 NOTE — ED PROVIDER NOTE - PHYSICAL EXAMINATION
CONSTITUTIONAL: Well-appearing; well-nourished; in no apparent distress.   	HEAD: Normocephalic; atraumatic.   	EYES:  conjunctiva and sclera clear  	ENT: normal nose; no rhinorrhea; normal pharynx with no erythema or lesions.   	NECK: Supple; non-tender;   	CARDIOVASCULAR: Normal S1, S2; no murmurs, rubs, or gallops. Regular rate and rhythm.   	RESPIRATORY: Breathing easily; breath sounds clear and equal bilaterally; no wheezes, rhonchi, or rales.  	GI: Soft; non-distended; +epigastric and LUQ tenderness. no guarding or rebound. +L CVAT  	EXT: NARANJO x 4. normal gait.   	SKIN: Normal for age and race; warm; dry; good turgor; no apparent lesions or rash.   	NEURO: A & O x 3; face symmetric; grossly unremarkable.   PSYCHOLOGICAL: The patient’s mood and manner are appropriate.

## 2023-08-08 NOTE — ED PROVIDER NOTE - PATIENT PORTAL LINK FT
You can access the FollowMyHealth Patient Portal offered by [facility] by registering at the following website: [email or web address]. By joining "[employer]’s FollowMyHealth portal, you will also be able to view your health information using other applications (apps) compatible with our system.

## 2023-08-08 NOTE — ED PROVIDER NOTE - NSFOLLOWUPINSTRUCTIONS_ED_ALL_ED_FT
Please take medications as prescribed  Drink plenty of fluids  Follow up with gastroenterology    You may take Tylenol 500 mg every 8 hours as needed for pain    Return for any concerning or worsening symptoms including severe pain, persistent vomiting, fever or any concerns.

## 2023-08-08 NOTE — ED ADULT NURSE NOTE - OBJECTIVE STATEMENT
pt c/o LUQ abd pain radiating to back/flank starting yesterday associated with +N/V/D. denies dysuria, denies pain on palpation, denies hx of kidney stones, denies fever/chills. a+ox4, resp even and unlabored, steady gait.

## 2023-08-08 NOTE — ED PROVIDER NOTE - CARE PROVIDER_API CALL
Zackery Christina  Gastroenterology  [address] [place] [address], [place] [address]  Phone: [phone]  Fax: [phone]  Follow Up Time:

## 2023-08-08 NOTE — ED ADULT NURSE NOTE - NSFALLUNIVINTERV_ED_ALL_ED
Bed/Stretcher in lowest position, wheels locked, appropriate side rails in place/Call bell, personal items and telephone in reach/Instruct patient to call for assistance before getting out of bed/chair/stretcher/Non-slip footwear applied when patient is off stretcher/[place] to call system/Physically safe environment - no spills, clutter or unnecessary equipment/Purposeful proactive rounding/Room/bathroom lighting operational, light cord in reach

## 2023-08-11 LAB
CULTURE RESULTS: SIGNIFICANT CHANGE UP
SPECIMEN SOURCE: SIGNIFICANT CHANGE UP

## 2023-08-16 ENCOUNTER — NON-APPOINTMENT (OUTPATIENT)
Age: 23
End: 2023-08-16

## 2023-08-16 ENCOUNTER — APPOINTMENT (OUTPATIENT)
Dept: OBGYN | Facility: CLINIC | Age: 23
End: 2023-08-16
Payer: MEDICAID

## 2023-08-16 VITALS — SYSTOLIC BLOOD PRESSURE: 110 MMHG | DIASTOLIC BLOOD PRESSURE: 70 MMHG

## 2023-08-16 DIAGNOSIS — Z11.3 ENCOUNTER FOR SCREENING FOR INFECTIONS WITH A PREDOMINANTLY SEXUAL MODE OF TRANSMISSION: ICD-10-CM

## 2023-08-16 PROCEDURE — 99212 OFFICE O/P EST SF 10 MIN: CPT

## 2023-08-16 NOTE — HISTORY OF PRESENT ILLNESS
[Patient reported PAP Smear was normal] : Patient reported PAP Smear was normal [Y] : Positive pregnancy history [Currently Active] : currently active [Patient would like to be screened for STIs] : Patient would like to be screened for STIs [HIV Test offered] : HIV Test offered [Syphilis test offered] : Syphilis test offered [Gonorrhea test offered] : Gonorrhea test offered [Chlamydia test offered] : Chlamydia test offered [Trichomonas test offered] : Trichomonas test offered [Hepatitis B test offered] : Hepatitis B test offered [Hepatitis C test offered] : Hepatitis C test offered [PapSmeardate] : 1/2022 [LMPDate] : 8/15/2023 [MensesLength] : 3-7 [de-identified] : Nexplanon [facility] : [facility] [PGHxABSpont] : 1 [FreeTextEntry1] : 8/15/2023 [Men] : men [Yes] : Yes [No] : No

## 2023-08-16 NOTE — PLAN
[FreeTextEntry1] : No Physical exam today- heavy menstrual flow Urine and serum STI screen sent Patient made aware that normal length menstrual flow is up to 7 days- patient to monitor for any increases from that point Discussed possible causes for prolonged menstrual bleeding including but not limited to hormone dysfunction, fibroids, polyps, vaginal infections, trauma, early pregnancy Patient to monitor cycles for the next few months and RTC if worsens Vaginal and pelvic warning s/s and when to seek care reviewed F/U pending results RTC for annual GYN exam after menses

## 2023-08-17 DIAGNOSIS — A59.9 TRICHOMONIASIS, UNSPECIFIED: ICD-10-CM

## 2023-08-17 LAB
C TRACH RRNA SPEC QL NAA+PROBE: NOT DETECTED
HIV1+2 AB SPEC QL IA.RAPID: NONREACTIVE
N GONORRHOEA RRNA SPEC QL NAA+PROBE: NOT DETECTED
SOURCE AMPLIFICATION: NORMAL
SOURCE AMPLIFICATION: NORMAL
T PALLIDUM AB SER QL IA: NEGATIVE
T VAGINALIS RRNA SPEC QL NAA+PROBE: DETECTED

## 2023-08-17 RX ORDER — METRONIDAZOLE 500 MG/1
500 TABLET ORAL ONCE
Qty: 4 | Refills: 0 | Status: ACTIVE | COMMUNITY
Start: 2023-08-17 | End: 1900-01-01

## 2023-08-17 RX ORDER — METRONIDAZOLE 500 MG/1
500 TABLET ORAL TWICE DAILY
Qty: 14 | Refills: 0 | Status: ACTIVE | COMMUNITY
Start: 2023-08-17 | End: 1900-01-01

## 2023-08-18 LAB
HBV SURFACE AG SER QL: NONREACTIVE
HCV AB SER QL: NONREACTIVE
HCV S/CO RATIO: 0.1 S/CO

## 2023-08-31 PROBLEM — Z23 ENCOUNTER FOR IMMUNIZATION: Status: ACTIVE | Noted: 2023-08-31

## 2023-08-31 NOTE — PHYSICAL EXAM
[No Acute Distress] : no acute distress [Well Nourished] : well nourished [Well Developed] : well developed [Well-Appearing] : well-appearing [Normal Sclera/Conjunctiva] : normal sclera/conjunctiva [PERRL] : pupils equal round and reactive to light [EOMI] : extraocular movements intact [Normal Outer Ear/Nose] : the outer ears and nose were normal in appearance [Normal Oropharynx] : the oropharynx was normal [No JVD] : no jugular venous distention [No Lymphadenopathy] : no lymphadenopathy [Supple] : supple [Thyroid Normal, No Nodules] : the thyroid was normal and there were no nodules present [No Respiratory Distress] : no respiratory distress  [No Accessory Muscle Use] : no accessory muscle use [Clear to Auscultation] : lungs were clear to auscultation bilaterally [Normal Rate] : normal rate  [Regular Rhythm] : with a regular rhythm [Normal S1, S2] : normal S1 and S2 [No Murmur] : no murmur heard [No Carotid Bruits] : no carotid bruits [No Abdominal Bruit] : a ~M bruit was not heard ~T in the abdomen [No Varicosities] : no varicosities [Pedal Pulses Present] : the pedal pulses are present [No Edema] : there was no peripheral edema [No Palpable Aorta] : no palpable aorta [No Extremity Clubbing/Cyanosis] : no extremity clubbing/cyanosis [Soft] : abdomen soft [No HSM] : no HSM [Normal Posterior Cervical Nodes] : no posterior cervical lymphadenopathy [Normal Anterior Cervical Nodes] : no anterior cervical lymphadenopathy [No CVA Tenderness] : no CVA  tenderness [No Spinal Tenderness] : no spinal tenderness [No Joint Swelling] : no joint swelling [Grossly Normal Strength/Tone] : grossly normal strength/tone [No Rash] : no rash [Coordination Grossly Intact] : coordination grossly intact [No Focal Deficits] : no focal deficits [Normal Gait] : normal gait [Deep Tendon Reflexes (DTR)] : deep tendon reflexes were 2+ and symmetric [Normal Affect] : the affect was normal [Normal Insight/Judgement] : insight and judgment were intact

## 2023-09-01 ENCOUNTER — APPOINTMENT (OUTPATIENT)
Dept: INTERNAL MEDICINE | Facility: CLINIC | Age: 23
End: 2023-09-01
Payer: MEDICAID

## 2023-09-01 VITALS
HEIGHT: 63 IN | BODY MASS INDEX: 34.06 KG/M2 | HEART RATE: 64 BPM | OXYGEN SATURATION: 99 % | SYSTOLIC BLOOD PRESSURE: 108 MMHG | TEMPERATURE: 98.4 F | DIASTOLIC BLOOD PRESSURE: 57 MMHG | WEIGHT: 192.25 LBS

## 2023-09-01 DIAGNOSIS — E66.9 OBESITY, UNSPECIFIED: ICD-10-CM

## 2023-09-01 DIAGNOSIS — R10.13 EPIGASTRIC PAIN: ICD-10-CM

## 2023-09-01 DIAGNOSIS — Z23 ENCOUNTER FOR IMMUNIZATION: ICD-10-CM

## 2023-09-01 DIAGNOSIS — Z13.1 ENCOUNTER FOR SCREENING FOR DIABETES MELLITUS: ICD-10-CM

## 2023-09-01 DIAGNOSIS — S86.012A STRAIN OF LEFT ACHILLES TENDON, INITIAL ENCOUNTER: ICD-10-CM

## 2023-09-01 DIAGNOSIS — Z00.00 ENCOUNTER FOR GENERAL ADULT MEDICAL EXAMINATION W/OUT ABNORMAL FINDINGS: ICD-10-CM

## 2023-09-01 DIAGNOSIS — Z13.220 ENCOUNTER FOR SCREENING FOR LIPOID DISORDERS: ICD-10-CM

## 2023-09-01 PROCEDURE — G0447 BEHAVIOR COUNSEL OBESITY 15M: CPT

## 2023-09-01 PROCEDURE — 99385 PREV VISIT NEW AGE 18-39: CPT

## 2023-09-01 NOTE — HISTORY OF PRESENT ILLNESS
[FreeTextEntry1] : 23-year-old female with history of cholecystectomy, biliary sphincterotomy, and appendectomy, now presents to establish medical care and for initial examination. [de-identified] : Patient was seen in ER on 8/8 for epigastric/LUQ pain with vomiting.  Abdominal CT scan and abdominal ultrasound both showed dilated common duct probably due to post-cholecystectomy status and hepatic steatosis, but were otherwise normal.  Patient's symptoms partially improved with fluids and she was diagnosed with diagnosis of presumed gastroenteritis.  Since discharge, she has been essentially asymptomatic, but she requests referral to gastroenterologist. Patient also diagnosed with vaginal trichomoniasis and has completed course of metronidazole. She gives history of chronic left ankle pain with l limitation of functional capacity, especially to climb stairs.  She requests a letter to support her application for a low-floor [facility] apartment.

## 2023-09-01 NOTE — ASSESSMENT
[FreeTextEntry1] : Health Maintenance Daily aerobic exercises strongly recommended. No STD risk or substance abuse per patient report. Occasional gender specific self-examination is suggested. Patient was seen by gynecology earlier this week and will follow-up as recommended. Negative for HIV antibody. No depression. Competent with ADLs. Colonoscopy is not due this year. Completed primary COVID-vaccine series without subsequent boosters.  Rationale to receive updated booster when becomes available within the next month was reviewed and to get yearly flu vaccine at the same time.  Patient states that she will consider.  Epigastric discomfort Currently in remission. At patient request, referral provided for gastroenterology consultation.  Chronic left Achilles tendinitis (s/p attempted repair) Referral provided for consultation with foot and ankle orthopedist. Medical letter provided to obtain lower level floor at [facility],  Obesity Her BMI is elevated at 34 and at least a 30 pound weight loss is recommended. Obesity-associated mortality/morbidity reviewed including high risk for HTN, heart disease, fatty liver (which she already has based on ultrasound study), diabetes, endocrine dysfunction, and lower extremity arthritis. Lifestyle management reviewed in detail including daily aerobic exercise as tolerated and structured low-fat/low-calorie diet.  Specific recommendations were provided including elimination of candy, cookies, cakes, snack foods, soda, and fruit juice and reduction in carbohydrates and fatty or fried foods. Patient declines referral for consultation with nutritionist. .

## 2023-09-01 NOTE — COUNSELING
[Potential consequences of obesity discussed] : Potential consequences of obesity discussed [Benefits of weight loss discussed] : Benefits of weight loss discussed [Structured Weight Management Program suggested:] : Structured weight management program suggested [Encouraged to maintain food diary] : Encouraged to maintain food diary [Encouraged to increase physical activity] : Encouraged to increase physical activity [Encouraged to use exercise tracking device] : Encouraged to use exercise tracking device [Target Wt Loss Goal ___] : Weight Loss Goals: Target weight loss goal [unfilled] lbs [Weigh Self Weekly] : weigh self weekly [Decrease Portions] : decrease portions [Keep Food Diary] : keep food diary [Needs reinforcement, provided] : Patient needs reinforcement on understanding of disease, goals and obesity follow-up plan; reinforcement was provided [FreeTextEntry4] : 15

## 2023-09-01 NOTE — HEALTH RISK ASSESSMENT
[Good] : ~his/her~  mood as  good [Never (0 pts)] : Never (0 points) [No] : In the past 12 months have you used drugs other than those required for medical reasons? No [No falls in past year] : Patient reported no falls in the past year [0] : 2) Feeling down, depressed, or hopeless: Not at all (0) [PHQ-2 Negative - No further assessment needed] : PHQ-2 Negative - No further assessment needed [Patient reported PAP Smear was normal] : Patient reported PAP Smear was normal [HIV test declined] : HIV test declined [Hepatitis C test declined] : Hepatitis C test declined [Unemployed] : unemployed [Single] : single [Fully functional (bathing, dressing, toileting, transferring, walking, feeding)] : Fully functional (bathing, dressing, toileting, transferring, walking, feeding) [Fully functional (using the telephone, shopping, preparing meals, housekeeping, doing laundry, using] : Fully functional and needs no help or supervision to perform IADLs (using the telephone, shopping, preparing meals, housekeeping, doing laundry, using transportation, managing medications and managing finances) [Seat Belt] :  uses seat belt [Sunscreen] : uses sunscreen [Patient/Caregiver not ready to engage] : , patient/caregiver not ready to engage [Never] : Never [Audit-CScore] : 0 [[id number]] : 0 [Change in mental status noted] : No change in mental status noted [Reports changes in hearing] : Reports no changes in hearing [Reports changes in vision] : Reports no changes in vision [Reports changes in dental health] : Reports no changes in dental health [PapSmearDate] : 09/2023 [TB Exposure] : is not being exposed to tuberculosis [de-identified] : with daughter [AdvancecareDate] : 09/2023

## 2023-09-05 DIAGNOSIS — J45.909 UNSPECIFIED ASTHMA, UNCOMPLICATED: ICD-10-CM

## 2023-09-05 LAB
ALBUMIN SERPL ELPH-MCNC: 4.3 G/DL
ALP BLD-CCNC: 58 U/L
ALT SERPL-CCNC: 9 U/L
ANION GAP SERPL CALC-SCNC: 12 MMOL/L
APPEARANCE: CLEAR
AST SERPL-CCNC: 14 U/L
BASOPHILS # BLD AUTO: 0.03 K/UL
BASOPHILS NFR BLD AUTO: 0.3 %
BILIRUB SERPL-MCNC: <0.2 MG/DL
BILIRUBIN URINE: NEGATIVE
BLOOD URINE: NEGATIVE
BUN SERPL-MCNC: 15 MG/DL
CALCIUM SERPL-MCNC: 9.2 MG/DL
CHLORIDE SERPL-SCNC: 107 MMOL/L
CHOLEST SERPL-MCNC: 137 MG/DL
CO2 SERPL-SCNC: 24 MMOL/L
COLOR: YELLOW
CREAT SERPL-MCNC: 0.76 MG/DL
EGFR: 111 ML/MIN/1.73M2
EOSINOPHIL # BLD AUTO: 0.3 K/UL
EOSINOPHIL NFR BLD AUTO: 2.8 %
ESTIMATED AVERAGE GLUCOSE: 111 MG/DL
GLUCOSE QUALITATIVE U: NEGATIVE MG/DL
GLUCOSE SERPL-MCNC: 91 MG/DL
HBA1C MFR BLD HPLC: 5.5 %
HCT VFR BLD CALC: 40.5 %
HDLC SERPL-MCNC: 41 MG/DL
HGB BLD-MCNC: 12.3 G/DL
IMM GRANULOCYTES NFR BLD AUTO: 0.4 %
KETONES URINE: NEGATIVE MG/DL
LDLC SERPL CALC-MCNC: 84 MG/DL
LEUKOCYTE ESTERASE URINE: NEGATIVE
LYMPHOCYTES # BLD AUTO: 3.27 K/UL
LYMPHOCYTES NFR BLD AUTO: 30.5 %
MAN DIFF?: NORMAL
MCHC RBC-ENTMCNC: 30.4 GM/DL
MCHC RBC-ENTMCNC: 30.6 PG
MCV RBC AUTO: 100.7 FL
MONOCYTES # BLD AUTO: 0.58 K/UL
MONOCYTES NFR BLD AUTO: 5.4 %
NEUTROPHILS # BLD AUTO: 6.5 K/UL
NEUTROPHILS NFR BLD AUTO: 60.6 %
NITRITE URINE: NEGATIVE
NONHDLC SERPL-MCNC: 96 MG/DL
PH URINE: 6
PLATELET # BLD AUTO: 268 K/UL
POTASSIUM SERPL-SCNC: 4.4 MMOL/L
PROT SERPL-MCNC: 6.7 G/DL
PROTEIN URINE: NEGATIVE MG/DL
RBC # BLD: 4.02 M/UL
RBC # FLD: 13.4 %
SODIUM SERPL-SCNC: 143 MMOL/L
SPECIFIC GRAVITY URINE: 1.02
TRIGL SERPL-MCNC: 59 MG/DL
UROBILINOGEN URINE: 0.2 MG/DL
WBC # FLD AUTO: 10.72 K/UL

## 2023-10-03 ENCOUNTER — APPOINTMENT (OUTPATIENT)
Dept: GASTROENTEROLOGY | Facility: CLINIC | Age: 23
End: 2023-10-03

## 2024-05-22 ENCOUNTER — APPOINTMENT (OUTPATIENT)
Dept: INTERNAL MEDICINE | Facility: CLINIC | Age: 24
End: 2024-05-22
Payer: MEDICAID

## 2024-05-22 VITALS
SYSTOLIC BLOOD PRESSURE: 123 MMHG | OXYGEN SATURATION: 97 % | TEMPERATURE: 97.9 F | DIASTOLIC BLOOD PRESSURE: 80 MMHG | HEART RATE: 72 BPM

## 2024-05-22 DIAGNOSIS — M65.28 CALCIFIC TENDINITIS, OTHER SITE: ICD-10-CM

## 2024-05-22 DIAGNOSIS — Z96.21 COCHLEAR IMPLANT STATUS: ICD-10-CM

## 2024-05-22 DIAGNOSIS — H53.9 UNSPECIFIED VISUAL DISTURBANCE: ICD-10-CM

## 2024-05-22 PROCEDURE — G2211 COMPLEX E/M VISIT ADD ON: CPT | Mod: NC,1L

## 2024-05-22 PROCEDURE — 99213 OFFICE O/P EST LOW 20 MIN: CPT

## 2024-05-22 RX ORDER — ALBUTEROL SULFATE 90 UG/1
108 (90 BASE) INHALANT RESPIRATORY (INHALATION)
Qty: 1 | Refills: 2 | Status: ACTIVE | COMMUNITY
Start: 2023-09-05 | End: 1900-01-01

## 2024-05-22 NOTE — ASSESSMENT
[FreeTextEntry1] : 1.)  Chronic Achilles tendon sprain. Updated letter signed and provided to patient  #2) Visual disturbance Referral provided for consultation with ophthalmologist.  #3) Cochlear implant (nonfunctioning) Referral provided to otolaryngology for reassessment and to see whether the implant can be upgraded.  Patient will return in 4 months for CPE.

## 2024-05-22 NOTE — HISTORY OF PRESENT ILLNESS
[FreeTextEntry1] : Chronic Achilles tendon sprain Visual disturbance Presence of cochlear implant [de-identified] : Patient request updated medical letter indicating chronic Achilles tendon sprain in order to get lower floor apartment at  public housing. Also she now gives prior history of visual disturbance since childhood likely secondary to astigmatism and requests referral for eye examination. She also now gives prior history of cochlear implant which has been in place for the past 7 years but she has been unable to use due to a missing piece.

## 2024-10-21 ENCOUNTER — APPOINTMENT (OUTPATIENT)
Dept: INTERNAL MEDICINE | Facility: CLINIC | Age: 24
End: 2024-10-21

## 2025-07-01 ENCOUNTER — NON-APPOINTMENT (OUTPATIENT)
Age: 25
End: 2025-07-01

## (undated) DEVICE — ENDOCATCH 10MM SPECIMEN POUCH

## (undated) DEVICE — SUT VICRYL 0 27" UR-6

## (undated) DEVICE — ELCTR BOVIE PENCIL BLADE 10FT

## (undated) DEVICE — D HELP - CLEARVIEW CLEARIFY SYSTEM

## (undated) DEVICE — DRSG STERISTRIPS 0.5 X 4"

## (undated) DEVICE — VENODYNE/SCD SLEEVE CALF MEDIUM

## (undated) DEVICE — TUBING STRYKER PNEUMOCLEAR HIGH FLOW

## (undated) DEVICE — XI TIP COVER

## (undated) DEVICE — PACK GENERAL LAPAROSCOPY

## (undated) DEVICE — XI ENDOWRIST SUCTION IRRIGATOR 8MM

## (undated) DEVICE — XI DRAPE COLUMN

## (undated) DEVICE — SUT MAXON 0 30" GS-11

## (undated) DEVICE — XI ARM FORCEP CADIERE 8MM

## (undated) DEVICE — DRAPE TOP SHEET 53" X 101"

## (undated) DEVICE — TROCAR COVIDIEN VERSAONE BLUNT TIP HASSAN 12MM

## (undated) DEVICE — XI OBTURATOR OPTICAL BLADELESS 8MM

## (undated) DEVICE — XI DRAPE ARM

## (undated) DEVICE — SUT MONOCRYL 4-0 27" PS-2 UNDYED

## (undated) DEVICE — SPHINCTEROTOME CLEVERCUT WIRE 25MM  2.8MM X 170CM

## (undated) DEVICE — DRSG MASTISOL

## (undated) DEVICE — XI SEAL UNIV 5- 8 MM